# Patient Record
Sex: FEMALE | Race: WHITE | Employment: STUDENT | URBAN - METROPOLITAN AREA
[De-identification: names, ages, dates, MRNs, and addresses within clinical notes are randomized per-mention and may not be internally consistent; named-entity substitution may affect disease eponyms.]

---
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
## 2021-01-11 PROBLEM — F32.2 MDD (MAJOR DEPRESSIVE DISORDER), SINGLE EPISODE, SEVERE (HCC): Status: ACTIVE | Noted: 2021-01-11

## 2021-01-11 NOTE — PROGRESS NOTES
FROM TELEPHONE ENCOUNTER TODAY;   Pt has severe depression now.   Pt has a SI plan and went out in the woods to kill herself yesterday.   Pt called SI hotline and was stopped.  Pt plan tonight was to not eat, take pills, and go back to the woods where no o

## 2021-01-11 NOTE — ED NOTES
Pt asking for paper and utensils to draw, pt is art major. Paper and crayons provided with tray now.

## 2021-01-11 NOTE — ED NOTES
Spoke with Luquillo Cordial in [facility] now, states that pt will not be evaluated until after 7pm tonight.

## 2021-01-11 NOTE — ED NOTES
Pt walked to bathroom now, updated on when [facility] planning to come. Pt states understanding. Still calm, cooperative. Agreeable to care. Menu provided now.

## 2021-01-11 NOTE — ED PROVIDER NOTES
Patient Seen in: [facility] Emergency Department      History   Patient presents with:  Eval-P    Stated Complaint:     HPI/Subjective:   HPI    49-year-old with a history of asthma and depression presents for evaluation of suicidal ideation with a conversational dyspnea. Abdomen: Soft, nontender, nondistended. Back: No CVA tenderness. Extremities: No edema. Neuro: No facial droop.   Speech is normal.  Patient is alert and answers questions appropriately  Psychiatric: No apparent hallucinations only for medical purposes. POCT PREGNANCY, URINE   CBC W/ DIFFERENTIAL          Patient remains calm and cooperative while in the ER         MDM        Patient awaiting [facility] evaluation.   At this point I do consider her medically clear for inpatien

## 2021-01-11 NOTE — ED NOTES
Spoke with Charge RN now regarding delay in [facility] seeing pt. Charge RN to see if [facility] can see pt any sooner.

## 2021-01-11 NOTE — ED NOTES
Itzel Souza to see pt, MONICA to call this writer with time frame on when [facility] will come to see pt today

## 2021-01-11 NOTE — ED INITIAL ASSESSMENT (HPI)
Pt in from psychiatrics office for SI. Was planning to not eat, take pills to kill self, Has not acted on plan. Here on own act, calm and cooperative.

## 2021-01-12 PROBLEM — T78.40XA ALLERGY: Status: ACTIVE | Noted: 2021-01-12

## 2021-01-12 NOTE — PROGRESS NOTES
01/11/21 1856   COVID Exposure Risk Screening   Have you been practicing social distancing? Yes   Have you been wearing a mask when in the community? Yes   Are the people you live with following social distancing and wearing a mask?  Yes  (Live with her

## 2021-01-12 NOTE — ED PROVIDER NOTES
Patient here for evaluation of suicidal ideation, she is medically cleared at this time is awaiting placement.   Is cooperative and resting comfortably during my shift

## 2021-01-12 NOTE — ED NOTES
Dr Sesar Acosta accepts pt to rm [id number]. RN to RN at [id number]. Include original Petition and Certicate at transfer.

## 2021-01-12 NOTE — PROGRESS NOTES
I talked to the patient did the social screening by phone. I discussed it with Pritesh Ortiz, patient needs Infection Safety Block, because she works in [employer], last time she works was on 1/8/21.  Patient still need a petition and certific

## 2021-04-09 DIAGNOSIS — Z23 NEED FOR VACCINATION: ICD-10-CM

## 2021-10-18 ENCOUNTER — WALK IN (OUTPATIENT)
Dept: URGENT CARE | Age: 21
End: 2021-10-18

## 2021-10-18 ENCOUNTER — IMAGING SERVICES (OUTPATIENT)
Dept: GENERAL RADIOLOGY | Age: 21
End: 2021-10-18

## 2021-10-18 VITALS
OXYGEN SATURATION: 96 % | DIASTOLIC BLOOD PRESSURE: 87 MMHG | RESPIRATION RATE: 18 BRPM | TEMPERATURE: 98.5 F | SYSTOLIC BLOOD PRESSURE: 135 MMHG | HEART RATE: 80 BPM

## 2021-10-18 DIAGNOSIS — R53.83 FATIGUE, UNSPECIFIED TYPE: ICD-10-CM

## 2021-10-18 DIAGNOSIS — R06.02 SHORTNESS OF BREATH: ICD-10-CM

## 2021-10-18 DIAGNOSIS — R05.9 COUGH: ICD-10-CM

## 2021-10-18 DIAGNOSIS — R05.9 COUGH: Primary | ICD-10-CM

## 2021-10-18 PROBLEM — J45.20 MILD INTERMITTENT ASTHMA: Status: ACTIVE | Noted: 2021-10-18

## 2021-10-18 PROBLEM — F32.A DEPRESSION: Status: ACTIVE | Noted: 2021-10-18

## 2021-10-18 LAB
SARS-COV-2 RNA RESP QL NAA+PROBE: NOT DETECTED
SERVICE CMNT-IMP: NORMAL
SERVICE CMNT-IMP: NORMAL

## 2021-10-18 PROCEDURE — 99203 OFFICE O/P NEW LOW 30 MIN: CPT | Performed by: FAMILY MEDICINE

## 2021-10-18 PROCEDURE — U0003 INFECTIOUS AGENT DETECTION BY NUCLEIC ACID (DNA OR RNA); SEVERE ACUTE RESPIRATORY SYNDROME CORONAVIRUS 2 (SARS-COV-2) (CORONAVIRUS DISEASE [COVID-19]), AMPLIFIED PROBE TECHNIQUE, MAKING USE OF HIGH THROUGHPUT TECHNOLOGIES AS DESCRIBED BY CMS-2020-01-R: HCPCS | Performed by: INTERNAL MEDICINE

## 2021-10-18 PROCEDURE — U0005 INFEC AGEN DETEC AMPLI PROBE: HCPCS | Performed by: INTERNAL MEDICINE

## 2021-10-18 PROCEDURE — 71046 X-RAY EXAM CHEST 2 VIEWS: CPT | Performed by: RADIOLOGY

## 2021-10-18 RX ORDER — FLUTICASONE PROPIONATE 50 MCG
2 SPRAY, SUSPENSION (ML) NASAL
COMMUNITY
Start: 2021-07-30

## 2021-10-18 RX ORDER — HYDROXYZINE 50 MG/1
50 TABLET, FILM COATED ORAL EVERY 8 HOURS PRN
COMMUNITY

## 2021-10-18 RX ORDER — PREDNISONE 20 MG/1
20 TABLET ORAL DAILY
Qty: 5 TABLET | Refills: 0 | Status: SHIPPED | OUTPATIENT
Start: 2021-10-18

## 2021-10-18 RX ORDER — ALBUTEROL SULFATE 90 UG/1
2 AEROSOL, METERED RESPIRATORY (INHALATION) EVERY 4 HOURS PRN
COMMUNITY
Start: 2021-10-13

## 2021-10-18 RX ORDER — AZITHROMYCIN 500 MG/1
500 TABLET, FILM COATED ORAL DAILY
Qty: 5 TABLET | Refills: 0 | Status: SHIPPED | OUTPATIENT
Start: 2021-10-18 | End: 2021-10-23

## 2021-10-18 RX ORDER — IBUPROFEN 200 MG
200 TABLET ORAL
COMMUNITY

## 2021-10-18 RX ORDER — NORGESTIMATE AND ETHINYL ESTRADIOL 0.25-0.035
1 KIT ORAL
COMMUNITY
Start: 2021-06-16

## 2021-10-18 RX ORDER — DIPHENHYDRAMINE HCL 25 MG
25 CAPSULE ORAL EVERY 6 HOURS PRN
COMMUNITY

## 2021-10-18 RX ORDER — FLUTICASONE PROPIONATE 220 UG/1
2 AEROSOL, METERED RESPIRATORY (INHALATION)
COMMUNITY
Start: 2021-06-16

## 2021-10-18 RX ORDER — FEXOFENADINE HCL 180 MG/1
180 TABLET ORAL DAILY
COMMUNITY

## 2021-10-18 RX ORDER — SERTRALINE HYDROCHLORIDE 100 MG/1
100 TABLET, FILM COATED ORAL DAILY
COMMUNITY
Start: 2021-09-21

## 2021-10-19 ENCOUNTER — TELEPHONE (OUTPATIENT)
Dept: URGENT CARE | Age: 21
End: 2021-10-19

## 2021-11-03 PROBLEM — J45.40 MODERATE PERSISTENT ASTHMA WITHOUT COMPLICATION: Status: ACTIVE | Noted: 2021-10-18

## 2021-12-01 ENCOUNTER — WALK IN (OUTPATIENT)
Dept: URGENT CARE | Age: 21
End: 2021-12-01

## 2021-12-01 VITALS
HEART RATE: 88 BPM | TEMPERATURE: 98.1 F | SYSTOLIC BLOOD PRESSURE: 126 MMHG | OXYGEN SATURATION: 98 % | RESPIRATION RATE: 18 BRPM | DIASTOLIC BLOOD PRESSURE: 76 MMHG

## 2021-12-01 DIAGNOSIS — R68.83 CHILLS: ICD-10-CM

## 2021-12-01 DIAGNOSIS — R11.0 NAUSEA: Primary | ICD-10-CM

## 2021-12-01 DIAGNOSIS — R51.9 FRONTAL HEADACHE: ICD-10-CM

## 2021-12-01 DIAGNOSIS — R50.9 SUBJECTIVE FEVER: ICD-10-CM

## 2021-12-01 LAB
B-HCG UR QL: NEGATIVE
FLUAV AG UPPER RESP QL IA.RAPID: NEGATIVE
FLUBV AG UPPER RESP QL IA.RAPID: NEGATIVE
INTERNAL PROCEDURAL CONTROLS ACCEPTABLE: YES

## 2021-12-01 PROCEDURE — U0003 INFECTIOUS AGENT DETECTION BY NUCLEIC ACID (DNA OR RNA); SEVERE ACUTE RESPIRATORY SYNDROME CORONAVIRUS 2 (SARS-COV-2) (CORONAVIRUS DISEASE [COVID-19]), AMPLIFIED PROBE TECHNIQUE, MAKING USE OF HIGH THROUGHPUT TECHNOLOGIES AS DESCRIBED BY CMS-2020-01-R: HCPCS | Performed by: INTERNAL MEDICINE

## 2021-12-01 PROCEDURE — 99213 OFFICE O/P EST LOW 20 MIN: CPT | Performed by: PHYSICIAN ASSISTANT

## 2021-12-01 PROCEDURE — 81025 URINE PREGNANCY TEST: CPT | Performed by: PHYSICIAN ASSISTANT

## 2021-12-01 PROCEDURE — 87804 INFLUENZA ASSAY W/OPTIC: CPT | Performed by: PHYSICIAN ASSISTANT

## 2021-12-01 PROCEDURE — 96372 THER/PROPH/DIAG INJ SC/IM: CPT | Performed by: PHYSICIAN ASSISTANT

## 2021-12-01 PROCEDURE — U0005 INFEC AGEN DETEC AMPLI PROBE: HCPCS | Performed by: INTERNAL MEDICINE

## 2021-12-01 RX ORDER — ONDANSETRON 4 MG/1
4 TABLET, ORALLY DISINTEGRATING ORAL EVERY 8 HOURS PRN
Qty: 12 TABLET | Refills: 0 | Status: SHIPPED | OUTPATIENT
Start: 2021-12-01

## 2021-12-01 RX ORDER — FLUTICASONE PROPIONATE 220 UG/1
2 AEROSOL, METERED RESPIRATORY (INHALATION)
COMMUNITY
Start: 2021-11-24

## 2021-12-01 RX ORDER — ONDANSETRON 4 MG/1
4 TABLET, ORALLY DISINTEGRATING ORAL ONCE
Status: COMPLETED | OUTPATIENT
Start: 2021-12-01 | End: 2021-12-01

## 2021-12-01 RX ORDER — KETOROLAC TROMETHAMINE 30 MG/ML
30 INJECTION, SOLUTION INTRAMUSCULAR; INTRAVENOUS ONCE
Status: COMPLETED | OUTPATIENT
Start: 2021-12-01 | End: 2021-12-01

## 2021-12-01 RX ADMIN — KETOROLAC TROMETHAMINE 30 MG: 30 INJECTION, SOLUTION INTRAMUSCULAR; INTRAVENOUS at 12:26

## 2021-12-01 RX ADMIN — ONDANSETRON 4 MG: 4 TABLET, ORALLY DISINTEGRATING ORAL at 12:27

## 2021-12-01 ASSESSMENT — ENCOUNTER SYMPTOMS
ABDOMINAL PAIN: 0
VOMITING: 0
HEADACHES: 1
DIARRHEA: 0
COUGH: 0
CHILLS: 1
FEVER: 1
SHORTNESS OF BREATH: 0
CONSTIPATION: 0
NAUSEA: 1

## 2021-12-02 ENCOUNTER — TELEPHONE (OUTPATIENT)
Dept: URGENT CARE | Age: 21
End: 2021-12-02

## 2021-12-02 LAB
SARS-COV-2 RNA RESP QL NAA+PROBE: NOT DETECTED
SERVICE CMNT-IMP: NORMAL
SERVICE CMNT-IMP: NORMAL

## 2022-10-31 ENCOUNTER — OFFICE VISIT (OUTPATIENT)
Dept: FAMILY MEDICINE CLINIC | Facility: CLINIC | Age: 22
End: 2022-10-31
Payer: COMMERCIAL

## 2022-10-31 VITALS
OXYGEN SATURATION: 98 % | TEMPERATURE: 98 F | WEIGHT: 232 LBS | HEIGHT: 65 IN | BODY MASS INDEX: 38.65 KG/M2 | SYSTOLIC BLOOD PRESSURE: 126 MMHG | DIASTOLIC BLOOD PRESSURE: 80 MMHG | RESPIRATION RATE: 16 BRPM | HEART RATE: 92 BPM

## 2022-10-31 DIAGNOSIS — E66.09 CLASS 2 OBESITY DUE TO EXCESS CALORIES WITHOUT SERIOUS COMORBIDITY WITH BODY MASS INDEX (BMI) OF 38.0 TO 38.9 IN ADULT: ICD-10-CM

## 2022-10-31 DIAGNOSIS — Z11.3 SCREENING FOR STD (SEXUALLY TRANSMITTED DISEASE): ICD-10-CM

## 2022-10-31 DIAGNOSIS — J45.30 MILD PERSISTENT ASTHMA WITHOUT COMPLICATION: ICD-10-CM

## 2022-10-31 DIAGNOSIS — F41.9 ANXIETY: ICD-10-CM

## 2022-10-31 DIAGNOSIS — Z00.00 HEALTHY ADULT ON ROUTINE PHYSICAL EXAMINATION: Primary | ICD-10-CM

## 2022-10-31 DIAGNOSIS — Z23 NEED FOR VACCINATION: ICD-10-CM

## 2022-10-31 PROBLEM — J45.40 MODERATE PERSISTENT ASTHMA WITHOUT COMPLICATION (HCC): Status: ACTIVE | Noted: 2021-10-18

## 2022-10-31 PROBLEM — F32.1 MODERATE MAJOR DEPRESSION (HCC): Status: ACTIVE | Noted: 2020-03-11

## 2022-10-31 PROBLEM — J45.40 MODERATE PERSISTENT ASTHMA WITHOUT COMPLICATION: Status: ACTIVE | Noted: 2021-10-18

## 2022-10-31 LAB
ALBUMIN SERPL-MCNC: 4 G/DL (ref 3.4–5)
ALBUMIN/GLOB SERPL: 1.3 {RATIO} (ref 1–2)
ALP LIVER SERPL-CCNC: 96 U/L
ALT SERPL-CCNC: 38 U/L
ANION GAP SERPL CALC-SCNC: 4 MMOL/L (ref 0–18)
AST SERPL-CCNC: 16 U/L (ref 15–37)
BASOPHILS # BLD AUTO: 0.04 X10(3) UL (ref 0–0.2)
BASOPHILS NFR BLD AUTO: 0.5 %
BILIRUB SERPL-MCNC: 0.3 MG/DL (ref 0.1–2)
BUN BLD-MCNC: 18 MG/DL (ref 7–18)
CALCIUM BLD-MCNC: 9.2 MG/DL (ref 8.5–10.1)
CHLORIDE SERPL-SCNC: 106 MMOL/L (ref 98–112)
CHOLEST SERPL-MCNC: 180 MG/DL (ref ?–200)
CO2 SERPL-SCNC: 25 MMOL/L (ref 21–32)
CREAT BLD-MCNC: 0.92 MG/DL
EOSINOPHIL # BLD AUTO: 0.46 X10(3) UL (ref 0–0.7)
EOSINOPHIL NFR BLD AUTO: 5.5 %
ERYTHROCYTE [DISTWIDTH] IN BLOOD BY AUTOMATED COUNT: 12.3 %
FASTING PATIENT LIPID ANSWER: NO
FASTING STATUS PATIENT QL REPORTED: NO
GFR SERPLBLD BASED ON 1.73 SQ M-ARVRAT: 90 ML/MIN/1.73M2 (ref 60–?)
GLOBULIN PLAS-MCNC: 3.1 G/DL (ref 2.8–4.4)
GLUCOSE BLD-MCNC: 110 MG/DL (ref 70–99)
HCT VFR BLD AUTO: 43.6 %
HDLC SERPL-MCNC: 61 MG/DL (ref 40–59)
HGB BLD-MCNC: 14.4 G/DL
IMM GRANULOCYTES # BLD AUTO: 0.02 X10(3) UL (ref 0–1)
IMM GRANULOCYTES NFR BLD: 0.2 %
LDLC SERPL CALC-MCNC: 100 MG/DL (ref ?–100)
LYMPHOCYTES # BLD AUTO: 2.56 X10(3) UL (ref 1–4)
LYMPHOCYTES NFR BLD AUTO: 30.8 %
MCH RBC QN AUTO: 28.3 PG (ref 26–34)
MCHC RBC AUTO-ENTMCNC: 33 G/DL (ref 31–37)
MCV RBC AUTO: 85.8 FL
MONOCYTES # BLD AUTO: 0.33 X10(3) UL (ref 0.1–1)
MONOCYTES NFR BLD AUTO: 4 %
NEUTROPHILS # BLD AUTO: 4.91 X10 (3) UL (ref 1.5–7.7)
NEUTROPHILS # BLD AUTO: 4.91 X10(3) UL (ref 1.5–7.7)
NEUTROPHILS NFR BLD AUTO: 59 %
NONHDLC SERPL-MCNC: 119 MG/DL (ref ?–130)
OSMOLALITY SERPL CALC.SUM OF ELEC: 283 MOSM/KG (ref 275–295)
PLATELET # BLD AUTO: 257 10(3)UL (ref 150–450)
POTASSIUM SERPL-SCNC: 4.2 MMOL/L (ref 3.5–5.1)
PROT SERPL-MCNC: 7.1 G/DL (ref 6.4–8.2)
RBC # BLD AUTO: 5.08 X10(6)UL
SODIUM SERPL-SCNC: 135 MMOL/L (ref 136–145)
TRIGL SERPL-MCNC: 105 MG/DL (ref 30–149)
TSI SER-ACNC: 1.59 MIU/ML (ref 0.36–3.74)
VLDLC SERPL CALC-MCNC: 17 MG/DL (ref 0–30)
WBC # BLD AUTO: 8.3 X10(3) UL (ref 4–11)

## 2022-10-31 PROCEDURE — 90471 IMMUNIZATION ADMIN: CPT | Performed by: FAMILY MEDICINE

## 2022-10-31 PROCEDURE — 90472 IMMUNIZATION ADMIN EACH ADD: CPT | Performed by: FAMILY MEDICINE

## 2022-10-31 PROCEDURE — 90686 IIV4 VACC NO PRSV 0.5 ML IM: CPT | Performed by: FAMILY MEDICINE

## 2022-10-31 PROCEDURE — 83036 HEMOGLOBIN GLYCOSYLATED A1C: CPT | Performed by: FAMILY MEDICINE

## 2022-10-31 PROCEDURE — 80050 GENERAL HEALTH PANEL: CPT | Performed by: FAMILY MEDICINE

## 2022-10-31 PROCEDURE — 3074F SYST BP LT 130 MM HG: CPT | Performed by: FAMILY MEDICINE

## 2022-10-31 PROCEDURE — 87491 CHLMYD TRACH DNA AMP PROBE: CPT | Performed by: FAMILY MEDICINE

## 2022-10-31 PROCEDURE — 3079F DIAST BP 80-89 MM HG: CPT | Performed by: FAMILY MEDICINE

## 2022-10-31 PROCEDURE — 90715 TDAP VACCINE 7 YRS/> IM: CPT | Performed by: FAMILY MEDICINE

## 2022-10-31 PROCEDURE — 80061 LIPID PANEL: CPT | Performed by: FAMILY MEDICINE

## 2022-10-31 PROCEDURE — 3008F BODY MASS INDEX DOCD: CPT | Performed by: FAMILY MEDICINE

## 2022-10-31 PROCEDURE — 87591 N.GONORRHOEAE DNA AMP PROB: CPT | Performed by: FAMILY MEDICINE

## 2022-10-31 PROCEDURE — 99395 PREV VISIT EST AGE 18-39: CPT | Performed by: FAMILY MEDICINE

## 2022-10-31 RX ORDER — FLUTICASONE PROPIONATE 220 UG/1
2 AEROSOL, METERED RESPIRATORY (INHALATION) 2 TIMES DAILY
Qty: 12 G | Refills: 0 | Status: SHIPPED | OUTPATIENT
Start: 2022-10-31

## 2022-10-31 RX ORDER — ALBUTEROL SULFATE 90 UG/1
2 AEROSOL, METERED RESPIRATORY (INHALATION) EVERY 4 HOURS PRN
Qty: 18 G | Refills: 0 | Status: SHIPPED | OUTPATIENT
Start: 2022-10-31

## 2022-11-01 LAB
C TRACH DNA SPEC QL NAA+PROBE: NEGATIVE
EST. AVERAGE GLUCOSE BLD GHB EST-MCNC: 128 MG/DL (ref 68–126)
HBA1C MFR BLD: 6.1 % (ref ?–5.7)
N GONORRHOEA DNA SPEC QL NAA+PROBE: NEGATIVE

## 2022-11-14 ENCOUNTER — PATIENT MESSAGE (OUTPATIENT)
Dept: FAMILY MEDICINE CLINIC | Facility: CLINIC | Age: 22
End: 2022-11-14

## 2022-11-14 NOTE — TELEPHONE ENCOUNTER
From: Thao Piedra  To: Jaqui Kimball DO  Sent: 11/14/2022 8:54 AM CST  Subject: Paige Tipton! I had a situation yesterday night at work where I fainted, I'll give you details below. I was seen by paramedics and they said all my vitals were fine( blood sugar and blood pressure were okay)but to get checked out eventually anyways, because it's not normal for me to pass out like that. . some people said it couldve been stress, lack of food during my shift, and also I DO get squeamish with certain situations regarding the body. My mother said it looked like a popped a blood vessel in my finger, but the bruise is gone now. So I was at work, standing at the [employer] stand and I felt pinching in the joint of my finger, and it started to get bruised. I was trying to figure out why it was doing that but then I started to get dizzy, my vision blurring and my hearing went too. I remember telling my coworker that I was feeling dizzy but i guess i passed out right after and fell backwards. My coworker tried to catch me but she told me that I did hit my head (I dont have any pain in my head and the paramedics checked me out too) she also said I was shaking like I was passed out for 3 minutes. But when I woke up I was pretty alert so the paramedics said it couldnt have been a seizure. I just wanted to ask what you thought.  Sorry for the long message, thank you! :)

## 2022-11-18 ENCOUNTER — OFFICE VISIT (OUTPATIENT)
Dept: FAMILY MEDICINE CLINIC | Facility: CLINIC | Age: 22
End: 2022-11-18
Payer: COMMERCIAL

## 2022-11-18 VITALS
DIASTOLIC BLOOD PRESSURE: 72 MMHG | TEMPERATURE: 98 F | BODY MASS INDEX: 39 KG/M2 | WEIGHT: 236.19 LBS | OXYGEN SATURATION: 96 % | RESPIRATION RATE: 16 BRPM | HEART RATE: 91 BPM | SYSTOLIC BLOOD PRESSURE: 124 MMHG

## 2022-11-18 DIAGNOSIS — R55 VASOVAGAL SYNCOPE: Primary | ICD-10-CM

## 2022-11-19 LAB
ATRIAL RATE: 87 BPM
P AXIS: 35 DEGREES
P-R INTERVAL: 132 MS
Q-T INTERVAL: 342 MS
QRS DURATION: 86 MS
QTC CALCULATION (BEZET): 411 MS
R AXIS: 40 DEGREES
T AXIS: 46 DEGREES
VENTRICULAR RATE: 87 BPM

## 2022-12-07 DIAGNOSIS — J45.30 MILD PERSISTENT ASTHMA WITHOUT COMPLICATION: ICD-10-CM

## 2022-12-07 RX ORDER — FLUTICASONE PROPIONATE 220 UG/1
AEROSOL, METERED RESPIRATORY (INHALATION)
Qty: 12 G | Refills: 0 | Status: SHIPPED | OUTPATIENT
Start: 2022-12-07

## 2022-12-07 NOTE — TELEPHONE ENCOUNTER
Asthma & COPD Medication Protocol Failed 12/07/2022 04:19 PM    Asthma Action Score greater than or equal to 20    AAP/ACT given in last 12 months    Appointment in past 6 or next 3 months       Routing to provider per protocol. FLOVENT  MCG/ACT Inhalation Aerosol  Last refilled on 10/31/22 for #12g  with 0 rf. Last labs 10/31/22. Last seen on 11/18/22. No future appointments. Thank you.

## 2023-01-05 DIAGNOSIS — J45.30 MILD PERSISTENT ASTHMA WITHOUT COMPLICATION: ICD-10-CM

## 2023-01-06 RX ORDER — ALBUTEROL SULFATE 90 UG/1
AEROSOL, METERED RESPIRATORY (INHALATION)
Qty: 18 G | Refills: 0 | Status: SHIPPED | OUTPATIENT
Start: 2023-01-06

## 2023-02-20 DIAGNOSIS — J45.30 MILD PERSISTENT ASTHMA WITHOUT COMPLICATION: ICD-10-CM

## 2023-02-21 RX ORDER — ALBUTEROL SULFATE 90 UG/1
AEROSOL, METERED RESPIRATORY (INHALATION)
Qty: 18 G | Refills: 0 | Status: SHIPPED | OUTPATIENT
Start: 2023-02-21

## 2023-07-17 DIAGNOSIS — J45.30 MILD PERSISTENT ASTHMA WITHOUT COMPLICATION: ICD-10-CM

## 2023-07-18 RX ORDER — ALBUTEROL SULFATE 90 UG/1
2 AEROSOL, METERED RESPIRATORY (INHALATION) EVERY 4 HOURS PRN
Qty: 18 G | Refills: 0 | Status: SHIPPED | OUTPATIENT
Start: 2023-07-18

## 2023-07-18 NOTE — TELEPHONE ENCOUNTER
Asthma & COPD Medication Protocol Hgadoj2907/17/2023 10:39 PM    Asthma Action Score greater than or equal to 20    Appointment in past 6 or next 3 months    AAP/ACT given in last 12 months     Last OV 11/18/22  Last refilled 2/21/23  18 g  0 refills

## 2023-08-08 ENCOUNTER — OFFICE VISIT (OUTPATIENT)
Dept: FAMILY MEDICINE CLINIC | Facility: CLINIC | Age: 23
End: 2023-08-08
Payer: COMMERCIAL

## 2023-08-08 VITALS
RESPIRATION RATE: 16 BRPM | HEART RATE: 76 BPM | BODY MASS INDEX: 39 KG/M2 | WEIGHT: 235 LBS | TEMPERATURE: 98 F | OXYGEN SATURATION: 97 % | SYSTOLIC BLOOD PRESSURE: 126 MMHG | DIASTOLIC BLOOD PRESSURE: 70 MMHG

## 2023-08-08 DIAGNOSIS — H92.01 RIGHT EAR PAIN: Primary | ICD-10-CM

## 2023-08-08 PROCEDURE — 3074F SYST BP LT 130 MM HG: CPT | Performed by: NURSE PRACTITIONER

## 2023-08-08 PROCEDURE — 3078F DIAST BP <80 MM HG: CPT | Performed by: NURSE PRACTITIONER

## 2023-08-08 PROCEDURE — 99213 OFFICE O/P EST LOW 20 MIN: CPT | Performed by: NURSE PRACTITIONER

## 2023-09-30 DIAGNOSIS — J45.30 MILD PERSISTENT ASTHMA WITHOUT COMPLICATION: ICD-10-CM

## 2023-09-30 RX ORDER — ALBUTEROL SULFATE 90 UG/1
2 AEROSOL, METERED RESPIRATORY (INHALATION) EVERY 4 HOURS PRN
Qty: 18 G | Refills: 0 | Status: SHIPPED | OUTPATIENT
Start: 2023-09-30

## 2023-09-30 NOTE — TELEPHONE ENCOUNTER
Pt failed refill protocol for the following reasons:  Asthma & COPD Medication Protocol Vrvvrn6609/30/2023 09:57 AM    Asthma Action Score greater than or equal to 20    AAP/ACT given in last 12 months    Appointment in past 6 or next 3 months         Last refill: 7/18/23  Last appt: 11/18/23  Next appt: No future appointments. Forward to Dr. Srikanth Escudero, please advise on refills. Thank you.

## 2023-10-11 ENCOUNTER — OFFICE VISIT (OUTPATIENT)
Dept: FAMILY MEDICINE CLINIC | Facility: CLINIC | Age: 23
End: 2023-10-11
Payer: COMMERCIAL

## 2023-10-11 VITALS
BODY MASS INDEX: 39.27 KG/M2 | HEIGHT: 64 IN | DIASTOLIC BLOOD PRESSURE: 78 MMHG | TEMPERATURE: 97 F | RESPIRATION RATE: 18 BRPM | WEIGHT: 230 LBS | HEART RATE: 51 BPM | OXYGEN SATURATION: 98 % | SYSTOLIC BLOOD PRESSURE: 118 MMHG

## 2023-10-11 DIAGNOSIS — S39.012A LUMBOSACRAL STRAIN, INITIAL ENCOUNTER: Primary | ICD-10-CM

## 2023-10-11 PROCEDURE — 3078F DIAST BP <80 MM HG: CPT | Performed by: PHYSICIAN ASSISTANT

## 2023-10-11 PROCEDURE — 3008F BODY MASS INDEX DOCD: CPT | Performed by: PHYSICIAN ASSISTANT

## 2023-10-11 PROCEDURE — 99213 OFFICE O/P EST LOW 20 MIN: CPT | Performed by: PHYSICIAN ASSISTANT

## 2023-10-11 PROCEDURE — 3074F SYST BP LT 130 MM HG: CPT | Performed by: PHYSICIAN ASSISTANT

## 2023-10-11 RX ORDER — CYCLOBENZAPRINE HCL 10 MG
10 TABLET ORAL 3 TIMES DAILY PRN
Qty: 30 TABLET | Refills: 0 | Status: SHIPPED | OUTPATIENT
Start: 2023-10-11

## 2023-11-12 DIAGNOSIS — J45.30 MILD PERSISTENT ASTHMA WITHOUT COMPLICATION: ICD-10-CM

## 2023-11-13 RX ORDER — ALBUTEROL SULFATE 90 UG/1
2 AEROSOL, METERED RESPIRATORY (INHALATION) EVERY 4 HOURS PRN
Qty: 18 G | Refills: 0 | Status: SHIPPED | OUTPATIENT
Start: 2023-11-13

## 2023-11-16 ENCOUNTER — HOSPITAL ENCOUNTER (EMERGENCY)
Age: 23
Discharge: HOME | End: 2023-11-16
Attending: EMERGENCY MEDICINE
Payer: COMMERCIAL

## 2023-11-16 VITALS
RESPIRATION RATE: 18 BRPM | BODY MASS INDEX: 39.27 KG/M2 | WEIGHT: 230 LBS | HEART RATE: 79 BPM | TEMPERATURE: 98 F | HEIGHT: 64 IN | DIASTOLIC BLOOD PRESSURE: 75 MMHG | OXYGEN SATURATION: 99 % | SYSTOLIC BLOOD PRESSURE: 115 MMHG

## 2023-11-16 DIAGNOSIS — S61.215A LACERATION OF LEFT RING FINGER WITHOUT FOREIGN BODY WITHOUT DAMAGE TO NAIL, INITIAL ENCOUNTER: Primary | ICD-10-CM

## 2023-11-16 PROCEDURE — 99283 EMERGENCY DEPT VISIT LOW MDM: CPT

## 2023-11-16 PROCEDURE — 99282 EMERGENCY DEPT VISIT SF MDM: CPT

## 2023-12-21 DIAGNOSIS — J45.30 MILD PERSISTENT ASTHMA WITHOUT COMPLICATION: ICD-10-CM

## 2023-12-21 RX ORDER — ALBUTEROL SULFATE 90 UG/1
2 AEROSOL, METERED RESPIRATORY (INHALATION) EVERY 4 HOURS PRN
Qty: 18 G | Refills: 0 | Status: SHIPPED | OUTPATIENT
Start: 2023-12-21

## 2024-01-20 DIAGNOSIS — J45.30 MILD PERSISTENT ASTHMA WITHOUT COMPLICATION: ICD-10-CM

## 2024-01-22 RX ORDER — ALBUTEROL SULFATE 90 UG/1
2 AEROSOL, METERED RESPIRATORY (INHALATION) EVERY 4 HOURS PRN
Qty: 18 G | Refills: 0 | Status: SHIPPED | OUTPATIENT
Start: 2024-01-22

## 2024-01-23 ENCOUNTER — OFFICE VISIT (OUTPATIENT)
Dept: FAMILY MEDICINE CLINIC | Facility: CLINIC | Age: 24
End: 2024-01-23
Payer: COMMERCIAL

## 2024-01-23 VITALS
RESPIRATION RATE: 20 BRPM | BODY MASS INDEX: 39 KG/M2 | OXYGEN SATURATION: 98 % | TEMPERATURE: 99 F | SYSTOLIC BLOOD PRESSURE: 120 MMHG | WEIGHT: 229.25 LBS | DIASTOLIC BLOOD PRESSURE: 82 MMHG | HEART RATE: 87 BPM

## 2024-01-23 DIAGNOSIS — R09.81 NASAL CONGESTION: ICD-10-CM

## 2024-01-23 DIAGNOSIS — M79.642 PAIN IN BOTH HANDS: ICD-10-CM

## 2024-01-23 DIAGNOSIS — R53.82 CHRONIC FATIGUE: ICD-10-CM

## 2024-01-23 DIAGNOSIS — J30.9 ALLERGIC RHINITIS, UNSPECIFIED SEASONALITY, UNSPECIFIED TRIGGER: ICD-10-CM

## 2024-01-23 DIAGNOSIS — Z23 NEED FOR VACCINATION: ICD-10-CM

## 2024-01-23 DIAGNOSIS — M79.641 PAIN IN BOTH HANDS: ICD-10-CM

## 2024-01-23 DIAGNOSIS — R06.83 SNORING: Primary | ICD-10-CM

## 2024-01-23 PROCEDURE — 99214 OFFICE O/P EST MOD 30 MIN: CPT | Performed by: FAMILY MEDICINE

## 2024-01-23 PROCEDURE — 3074F SYST BP LT 130 MM HG: CPT | Performed by: FAMILY MEDICINE

## 2024-01-23 PROCEDURE — 90471 IMMUNIZATION ADMIN: CPT | Performed by: FAMILY MEDICINE

## 2024-01-23 PROCEDURE — 90686 IIV4 VACC NO PRSV 0.5 ML IM: CPT | Performed by: FAMILY MEDICINE

## 2024-01-23 PROCEDURE — 3079F DIAST BP 80-89 MM HG: CPT | Performed by: FAMILY MEDICINE

## 2024-01-23 RX ORDER — SERTRALINE HYDROCHLORIDE 100 MG/1
100 TABLET, FILM COATED ORAL DAILY
COMMUNITY
Start: 2023-12-13

## 2024-01-23 RX ORDER — MONTELUKAST SODIUM 10 MG/1
10 TABLET ORAL NIGHTLY
Qty: 30 TABLET | Refills: 0 | Status: SHIPPED | OUTPATIENT
Start: 2024-01-23

## 2024-01-23 NOTE — PROGRESS NOTES
Francisca Piedra is a 23 year old female.  Chief Complaint   Patient presents with    Medication Follow-Up    Sleep Apnea       HPI:   Sleep apnea: heavy snoring. Screening at dentist was positive. Mom has to wake her up, stops breathing. Waking up tired, tired while driving.     Has constant nasal congestion. Tried nasal spray, hasn't worked. Mouth breaths all the time. Takes allegra daily. Has tried flonase and a prescription in the past.     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 Not sleeping much.     Hands, wrists, knuckle joints will hurt. Elbows and wrists will hurt. Uses compression braces, doesn't always help. Ibuprofen helps some. Worse in cold weather. Hasn't noticed swelling. No stiffness in hands. Feet are sometimes stiff in the AM. Wearing supportive shoes.     Gets pain in arch of feet.     Due for physical. Sees gyne, but last pap was 2021.     ALLERGIES:  Allergies   Allergen Reactions    Other ITCHING    Seasonal ITCHING         Current Outpatient Medications   Medication Sig Dispense Refill    sertraline 100 MG Oral Tab Take 1 tablet (100 mg total) by mouth daily.      montelukast 10 MG Oral Tab Take 1 tablet (10 mg total) by mouth nightly. 30 tablet 0    albuterol 108 (90 Base) MCG/ACT Inhalation Aero Soln Inhale 2 puffs into the lungs every 4 (four) hours as needed for Wheezing. 18 g 0    FLOVENT  MCG/ACT Inhalation Aerosol INHALE 2 PUFFS INTO THE LUNGS EVERY NIGHT IN THE MORNING AND AT BEDTIME 12 g 0    fexofenadine 180 MG Oral Tab Take 1 tablet (180 mg total) by mouth daily.      hydrocortisone 1 % External Ointment Apply topically 2 (two) times daily.  0    cyclobenzaprine 10 MG Oral Tab Take 1 tablet (10 mg total) by mouth 3 (three) times daily as needed for Muscle spasms. (Patient not taking: Reported on 1/23/2024) 30 tablet 0    diphenhydrAMINE 25 MG Oral Cap Take 1 capsule (25 mg total) by mouth. (Patient not taking: Reported on 1/23/2024)      hydrOXYzine HCl 25 MG  Oral Tab Take 1 tablet (25 mg total) by mouth 3 (three) times daily as needed for Anxiety. (Patient not taking: Reported on 10/11/2023) 45 tablet 1    melatonin 3 MG Oral Tab For insomnia . Take 3 mm at bed time. (Patient not taking: Reported on 10/11/2023) 30 tablet 0      Past Medical History:   Diagnosis Date    Asthma     Depression       Social History:  Social History     Socioeconomic History    Marital status: Single   Tobacco Use    Smoking status: Never    Smokeless tobacco: Never   Vaping Use    Vaping Use: Never used   Substance and Sexual Activity    Alcohol use: Yes     Comment: social    Drug use: No        BP Readings from Last 6 Encounters:   01/23/24 120/82   11/16/23 115/75   10/11/23 118/78   08/08/23 126/70   11/18/22 124/72   10/31/22 126/80       Wt Readings from Last 6 Encounters:   01/23/24 229 lb 4 oz (104 kg)   11/16/23 230 lb (104.3 kg)   10/11/23 230 lb (104.3 kg)   08/08/23 235 lb (106.6 kg)   11/18/22 236 lb 3.2 oz (107.1 kg)   10/31/22 232 lb (105.2 kg)       REVIEW OF SYSTEMS:   GENERAL HEALTH: feels well no complaints other than above  SKIN: denies any unusual skin lesions or rashes  RESPIRATORY: denies shortness of breath    CARDIOVASCULAR: denies chest pain   GI: denies abdominal pain and denies heartburn  NEURO: denies headaches    EXAM:   /82 (BP Location: Left arm, Patient Position: Sitting, Cuff Size: large)   Pulse 87   Temp 98.6 °F (37 °C) (Temporal)   Resp 20   Wt 229 lb 4 oz (104 kg)   LMP 11/14/2023 (Exact Date)   SpO2 98%   BMI 39.35 kg/m²  Body mass index is 39.35 kg/m².      GENERAL: well developed, well nourished,in no apparent distress  SKIN: no rashes,no suspicious lesions  HEENT: atraumatic, normocephalic,ears and throat are clear, + nasal congestion   NECK: supple,no adenopathy  LUNGS: clear to auscultation  CARDIO: RRR without murmur  GI: good BS's,no masses, HSM or tenderness  EXTREMITIES: no cyanosis, clubbing or edema    ASSESSMENT AND PLAN:      Encounter Diagnoses   Name Primary?    Snoring Yes    Need for vaccination     Allergic rhinitis, unspecified seasonality, unspecified trigger     Nasal congestion     Chronic fatigue     Pain in both hands        Diagnoses and all orders for this visit:    Snoring  -     Home Sleep Apnea Test (Adult pt only) - Sleep consult required for Medicare pts  -     General sleep study; Future  - send for sleep study, very likely has ÁNGEL.     Need for vaccination  -     Fluzone Quadrivalent 6mo+ 0.5mL    Allergic rhinitis, unspecified seasonality, unspecified trigger  -     montelukast 10 MG Oral Tab; Take 1 tablet (10 mg total) by mouth nightly.  - trial of singulair for nasal congestion and chronic cough.   - follow up in 1 month for physical and will check labs and decide if further treatment is needed.     Nasal congestion  -     Home Sleep Apnea Test (Adult pt only) - Sleep consult required for Medicare pts  -     General sleep study; Future    Chronic fatigue  -     Home Sleep Apnea Test (Adult pt only) - Sleep consult required for Medicare pts  -     General sleep study; Future    Pain in hands  - consider RF with labs.     Orders Placed This Encounter   Procedures    Fluzone Quadrivalent 6mo+ 0.5mL               Meds & Refills for this Visit:  Requested Prescriptions     Signed Prescriptions Disp Refills    montelukast 10 MG Oral Tab 30 tablet 0     Sig: Take 1 tablet (10 mg total) by mouth nightly.             The patient indicates understanding of these issues and agrees to the plan.

## 2024-01-24 ENCOUNTER — PATIENT MESSAGE (OUTPATIENT)
Dept: FAMILY MEDICINE CLINIC | Facility: CLINIC | Age: 24
End: 2024-01-24

## 2024-01-24 NOTE — TELEPHONE ENCOUNTER
From: Francisca Piedra  To: Crissy Rose  Sent: 1/24/2024 12:18 PM CST  Subject: Home Sleep Lab    Ruby Rose, I saw that you ordered an at home sleep lab. I was wondering if this means I get a kit sent to my house or is there something else I need to do? Thank you!

## 2024-01-29 ENCOUNTER — PATIENT MESSAGE (OUTPATIENT)
Dept: FAMILY MEDICINE CLINIC | Facility: CLINIC | Age: 24
End: 2024-01-29

## 2024-01-29 DIAGNOSIS — R09.81 NASAL CONGESTION: Primary | ICD-10-CM

## 2024-01-29 NOTE — TELEPHONE ENCOUNTER
From: Francisca Piedra  To: Crissy Rose  Sent: 1/29/2024 2:40 AM CST  Subject: Hacking/Coughing Fits    [place] again Dr. Rose, I am messaging you because a little bit before our visit I started to develope coughing fits that trigger my asthma and are making me gag and hack out of nowhere. I feel a little bit of something in the middle of my chest when I breathe but it's not the same thing as an asthma attack (even though it triggers it). It's starting to affect my sleep and it just happened again when I was at work. I'm hoping to get this under control soon because it francis hurts too. Thank you!

## 2024-01-30 RX ORDER — AZITHROMYCIN 250 MG/1
TABLET, FILM COATED ORAL
Qty: 6 TABLET | Refills: 0 | Status: SHIPPED | OUTPATIENT
Start: 2024-01-30 | End: 2024-02-03

## 2024-02-03 DIAGNOSIS — J45.30 MILD PERSISTENT ASTHMA WITHOUT COMPLICATION: ICD-10-CM

## 2024-02-03 NOTE — TELEPHONE ENCOUNTER
Asthma & COPD Medication Protocol Gkuknm2702/03/2024 08:52 AM    Asthma Action Score greater than or equal to 20    AAP/ACT given in last 12 months    Appointment in past 6 or next 3 months     Last OV:01/23/2024  Last refill:01/22/2024, 18 g    Medication pended, please sign if appropriate

## 2024-02-05 RX ORDER — ALBUTEROL SULFATE 90 UG/1
2 AEROSOL, METERED RESPIRATORY (INHALATION) EVERY 4 HOURS PRN
Qty: 18 G | Refills: 0 | Status: SHIPPED | OUTPATIENT
Start: 2024-02-05

## 2024-02-09 ENCOUNTER — HOSPITAL ENCOUNTER (OUTPATIENT)
Dept: GENERAL RADIOLOGY | Age: 24
Discharge: HOME | End: 2024-02-09
Attending: FAMILY MEDICINE
Payer: COMMERCIAL

## 2024-02-09 ENCOUNTER — OFFICE VISIT (OUTPATIENT)
Dept: FAMILY MEDICINE CLINIC | Facility: CLINIC | Age: 24
End: 2024-02-09
Payer: COMMERCIAL

## 2024-02-09 ENCOUNTER — TELEPHONE (OUTPATIENT)
Dept: SLEEP CENTER | Age: 24
End: 2024-02-09

## 2024-02-09 VITALS
HEART RATE: 81 BPM | RESPIRATION RATE: 18 BRPM | SYSTOLIC BLOOD PRESSURE: 136 MMHG | OXYGEN SATURATION: 98 % | TEMPERATURE: 98 F | BODY MASS INDEX: 39 KG/M2 | WEIGHT: 229 LBS | DIASTOLIC BLOOD PRESSURE: 72 MMHG

## 2024-02-09 DIAGNOSIS — R05.2 SUBACUTE COUGH: Primary | ICD-10-CM

## 2024-02-09 DIAGNOSIS — J40 BRONCHITIS: ICD-10-CM

## 2024-02-09 DIAGNOSIS — R05.2 SUBACUTE COUGH: ICD-10-CM

## 2024-02-09 PROCEDURE — 71046 X-RAY EXAM CHEST 2 VIEWS: CPT | Performed by: FAMILY MEDICINE

## 2024-02-09 PROCEDURE — 3075F SYST BP GE 130 - 139MM HG: CPT | Performed by: FAMILY MEDICINE

## 2024-02-09 PROCEDURE — 99214 OFFICE O/P EST MOD 30 MIN: CPT | Performed by: FAMILY MEDICINE

## 2024-02-09 PROCEDURE — 3078F DIAST BP <80 MM HG: CPT | Performed by: FAMILY MEDICINE

## 2024-02-09 RX ORDER — PREDNISONE 20 MG/1
40 TABLET ORAL DAILY
Qty: 10 TABLET | Refills: 0 | Status: SHIPPED | OUTPATIENT
Start: 2024-02-09 | End: 2024-02-14

## 2024-02-09 NOTE — PROGRESS NOTES
Francisca Piedra is a 23 year old female.  Chief Complaint   Patient presents with    Cough    Asthma       HPI:   Has been sick for 3 weeks.   Last week I sent in a zpack, that maybe helped a little.  But now it's getting worse again. Waking up in the middle of the night coughing.   Taking albuterol 2 puffs multiple times daily, helps only for a short time.   Gets really bad out of nowhere. Worse with laughing. Can start with a tickle.   Her asthma is usually triggered by temp changes.     ALLERGIES:  Allergies   Allergen Reactions    Other ITCHING    Seasonal ITCHING         Current Outpatient Medications   Medication Sig Dispense Refill    predniSONE 20 MG Oral Tab Take 2 tablets (40 mg total) by mouth daily for 5 days. 10 tablet 0    albuterol 108 (90 Base) MCG/ACT Inhalation Aero Soln Inhale 2 puffs into the lungs every 4 (four) hours as needed for Wheezing. 18 g 0    sertraline 100 MG Oral Tab Take 1 tablet (100 mg total) by mouth daily.      montelukast 10 MG Oral Tab Take 1 tablet (10 mg total) by mouth nightly. 30 tablet 0    fexofenadine 180 MG Oral Tab Take 1 tablet (180 mg total) by mouth daily.      diphenhydrAMINE 25 MG Oral Cap Take 1 capsule (25 mg total) by mouth.      hydrocortisone 1 % External Ointment Apply topically 2 (two) times daily.  0    cyclobenzaprine 10 MG Oral Tab Take 1 tablet (10 mg total) by mouth 3 (three) times daily as needed for Muscle spasms. (Patient not taking: Reported on 1/23/2024) 30 tablet 0    FLOVENT  MCG/ACT Inhalation Aerosol INHALE 2 PUFFS INTO THE LUNGS EVERY NIGHT IN THE MORNING AND AT BEDTIME (Patient not taking: Reported on 2/9/2024) 12 g 0    hydrOXYzine HCl 25 MG Oral Tab Take 1 tablet (25 mg total) by mouth 3 (three) times daily as needed for Anxiety. (Patient not taking: Reported on 10/11/2023) 45 tablet 1    melatonin 3 MG Oral Tab For insomnia . Take 3 mm at bed time. (Patient not taking: Reported on 10/11/2023) 30 tablet 0      Past Medical  History:   Diagnosis Date    Asthma     Depression       Social History:  Social History     Socioeconomic History    Marital status: Single   Tobacco Use    Smoking status: Never    Smokeless tobacco: Never   Vaping Use    Vaping Use: Never used   Substance and Sexual Activity    Alcohol use: Yes     Comment: social    Drug use: No        BP Readings from Last 6 Encounters:   02/09/24 136/72   01/23/24 120/82   11/16/23 115/75   10/11/23 118/78   08/08/23 126/70   11/18/22 124/72       Wt Readings from Last 6 Encounters:   02/09/24 229 lb (103.9 kg)   01/23/24 229 lb 4 oz (104 kg)   11/16/23 230 lb (104.3 kg)   10/11/23 230 lb (104.3 kg)   08/08/23 235 lb (106.6 kg)   11/18/22 236 lb 3.2 oz (107.1 kg)       REVIEW OF SYSTEMS:   GENERAL HEALTH: feels well no complaints other than above   SKIN: denies any unusual skin lesions or rashes  RESPIRATORY: denies shortness of breath    CARDIOVASCULAR: denies chest pain   GI: denies abdominal pain and denies heartburn  NEURO: denies headaches    EXAM:   /72 (BP Location: Left arm, Patient Position: Sitting, Cuff Size: large)   Pulse 81   Temp 98.3 °F (36.8 °C) (Temporal)   Resp 18   Wt 229 lb (103.9 kg)   LMP 02/09/2024 (Exact Date)   SpO2 98%   BMI 39.31 kg/m²  Body mass index is 39.31 kg/m².      GENERAL: well developed, well nourished,in no apparent distress  SKIN: no rashes,no suspicious lesions  HEENT: atraumatic, normocephalic,ears and throat are clear  NECK: supple,no adenopathy,   LUNGS: decreased air movement, no wheezing or rales.   CARDIO: RRR without murmur  GI: good BS's,no masses, HSM or tenderness  EXTREMITIES: no cyanosis, clubbing or edema    ASSESSMENT AND PLAN:     Encounter Diagnoses   Name Primary?    Subacute cough Yes    Bronchitis        Diagnoses and all orders for this visit:    Subacute cough  -     XR CHEST PA + LAT CHEST (CPT=71046); Future    Bronchitis  -     predniSONE 20 MG Oral Tab; Take 2 tablets (40 mg total) by mouth daily  for 5 days.    Treat as above.   CXR shows no PNA, so will hold off abx for now.   Follow up in 2 weeks if not getting better.     No orders of the defined types were placed in this encounter.              Meds & Refills for this Visit:  Requested Prescriptions     Signed Prescriptions Disp Refills    predniSONE 20 MG Oral Tab 10 tablet 0     Sig: Take 2 tablets (40 mg total) by mouth daily for 5 days.             The patient indicates understanding of these issues and agrees to the plan.

## 2024-02-24 DIAGNOSIS — J30.9 ALLERGIC RHINITIS, UNSPECIFIED SEASONALITY, UNSPECIFIED TRIGGER: ICD-10-CM

## 2024-02-26 NOTE — TELEPHONE ENCOUNTER
Patient notified via detailed voicemail left at cell number (ok per  HIPAA consent)  Advised to call office back to let KE know if med is helping or not.  Office phone number provided

## 2024-03-06 DIAGNOSIS — J45.30 MILD PERSISTENT ASTHMA WITHOUT COMPLICATION (HCC): ICD-10-CM

## 2024-03-06 RX ORDER — ALBUTEROL SULFATE 90 UG/1
2 AEROSOL, METERED RESPIRATORY (INHALATION) EVERY 4 HOURS PRN
Qty: 18 G | Refills: 1 | Status: SHIPPED | OUTPATIENT
Start: 2024-03-06

## 2024-03-06 RX ORDER — ALBUTEROL SULFATE 90 UG/1
2 AEROSOL, METERED RESPIRATORY (INHALATION) EVERY 4 HOURS PRN
Qty: 18 G | Refills: 1 | Status: CANCELLED | OUTPATIENT
Start: 2024-03-06

## 2024-04-02 ENCOUNTER — PATIENT MESSAGE (OUTPATIENT)
Dept: FAMILY MEDICINE CLINIC | Facility: CLINIC | Age: 24
End: 2024-04-02

## 2024-04-02 NOTE — TELEPHONE ENCOUNTER
From: Kailey KAMARA  To: Francisca Piedra  Sent: 4/2/2024 4:19 PM CDT  Subject: Medication - Montelukast    Arnaud    We've been trying to reach you regarding your medication- Montelukast.  Dr. Rose would like to know if it's helping?    You can reply to this [employer] message or call our office at [phone].    Thanks!  Kailey RAZA

## 2024-04-03 RX ORDER — MONTELUKAST SODIUM 10 MG/1
10 TABLET ORAL NIGHTLY
Qty: 30 TABLET | Refills: 0 | OUTPATIENT
Start: 2024-04-03

## 2024-04-03 NOTE — TELEPHONE ENCOUNTER
Psychiatric Non-Scheduled (Anti-Anxiety) Zbvahp1804/03/2024 02:48 PM   Protocol Details Depression Screening completed within the past 12 months    In person appointment or virtual visit in the past 6 mos or appointment in next 3 mos      Routing to provider per protocol.   sertraline 100 MG Oral Tab   Last refilled on 12/13/23 for #  with  rf. -EXTERNAL.  Last labs 10/31/22.   Last seen on 2/9/24.     No future appointments.       Thank you.

## 2024-04-04 DIAGNOSIS — J45.30 MILD PERSISTENT ASTHMA WITHOUT COMPLICATION (HCC): ICD-10-CM

## 2024-04-04 RX ORDER — SERTRALINE HYDROCHLORIDE 100 MG/1
100 TABLET, FILM COATED ORAL DAILY
Qty: 90 TABLET | Refills: 0 | Status: SHIPPED | OUTPATIENT
Start: 2024-04-04

## 2024-04-04 RX ORDER — ALBUTEROL SULFATE 90 UG/1
2 AEROSOL, METERED RESPIRATORY (INHALATION) EVERY 4 HOURS PRN
Qty: 18 G | Refills: 1 | Status: SHIPPED | OUTPATIENT
Start: 2024-04-04

## 2024-04-04 NOTE — TELEPHONE ENCOUNTER
Asthma & COPD Medication Protocol Dztidb9804/04/2024 11:44 AM   Protocol Details Asthma Action Score greater than or equal to 20    AAP/ACT given in last 12 months    Appointment in past 6 or next 3 months          LOV 2/9/24     Last Refill   albuterol 108 (90 Base) MCG/ACT Inhalation Aero Soln 18 g 1 3/6/2024       No future appointments.

## 2024-04-19 DIAGNOSIS — J45.30 MILD PERSISTENT ASTHMA WITHOUT COMPLICATION (HCC): ICD-10-CM

## 2024-04-19 RX ORDER — ALBUTEROL SULFATE 90 UG/1
2 AEROSOL, METERED RESPIRATORY (INHALATION) EVERY 4 HOURS PRN
Qty: 18 G | Refills: 1 | OUTPATIENT
Start: 2024-04-19

## 2024-05-09 DIAGNOSIS — J45.30 MILD PERSISTENT ASTHMA WITHOUT COMPLICATION (HCC): ICD-10-CM

## 2024-05-09 RX ORDER — ALBUTEROL SULFATE 90 UG/1
2 AEROSOL, METERED RESPIRATORY (INHALATION) EVERY 4 HOURS PRN
Qty: 18 G | Refills: 1 | Status: SHIPPED | OUTPATIENT
Start: 2024-05-09

## 2024-05-09 NOTE — TELEPHONE ENCOUNTER
Pt failed refill protocol for the following reasons:   albuterol 108 (90 Base) MCG/ACT Inhalation Aero Soln         Sig: Inhale 2 puffs into the lungs every 4 (four) hours as needed for Wheezing.    Disp: 18 g    Refills: 1    Start: 5/9/2024    Class: Normal    Non-formulary For: Mild persistent asthma without complication (HCC)    Last ordered: 1 month ago (4/4/2024) by Crissy Rose, DO    Asthma & COPD Medication Protocol Hhnuri3105/09/2024 12:45 PM   Protocol Details Asthma Action Score greater than or equal to 20    AAP/ACT given in last 12 months    Appointment in past 6 or next 3 months      To be filled at: [id number] [address] [facility]  [place], [phone], [phone]         Last refill: 4/4/24  Last appt: 2/9/24  Next appt: No future appointments.'      Forward to Dr. Rose, please advise on refills. Thank you.

## 2024-08-06 RX ORDER — SERTRALINE HYDROCHLORIDE 100 MG/1
100 TABLET, FILM COATED ORAL DAILY
Qty: 90 TABLET | Refills: 0 | Status: SHIPPED | OUTPATIENT
Start: 2024-08-06

## 2024-08-06 NOTE — TELEPHONE ENCOUNTER
Psychiatric Non-Scheduled (Anti-Anxiety) Xxrzpf6408/06/2024 12:45 PM   Protocol Details Depression Screening completed within the past 12 months    In person appointment or virtual visit in the past 6 mos or appointment in next 3 mos          LOV 2/9/24     Last Refill  Medication Quantity Refills Start End   sertraline 100 MG Oral Tab 90 tablet 0 4/4/2024        No future appointments.

## 2024-09-10 ENCOUNTER — OFFICE VISIT (OUTPATIENT)
Dept: FAMILY MEDICINE CLINIC | Facility: CLINIC | Age: 24
End: 2024-09-10
Payer: COMMERCIAL

## 2024-09-10 VITALS
BODY MASS INDEX: 41.39 KG/M2 | HEART RATE: 104 BPM | DIASTOLIC BLOOD PRESSURE: 84 MMHG | OXYGEN SATURATION: 96 % | HEIGHT: 63 IN | WEIGHT: 233.63 LBS | TEMPERATURE: 100 F | SYSTOLIC BLOOD PRESSURE: 122 MMHG

## 2024-09-10 DIAGNOSIS — F32.2 MDD (MAJOR DEPRESSIVE DISORDER), SINGLE EPISODE, SEVERE (HCC): ICD-10-CM

## 2024-09-10 DIAGNOSIS — Z00.00 HEALTHY ADULT ON ROUTINE PHYSICAL EXAMINATION: Primary | ICD-10-CM

## 2024-09-10 DIAGNOSIS — Z23 NEED FOR VACCINATION: ICD-10-CM

## 2024-09-10 DIAGNOSIS — J45.30 MILD PERSISTENT ASTHMA WITHOUT COMPLICATION (HCC): ICD-10-CM

## 2024-09-10 DIAGNOSIS — E66.09 CLASS 2 OBESITY DUE TO EXCESS CALORIES WITHOUT SERIOUS COMORBIDITY WITH BODY MASS INDEX (BMI) OF 38.0 TO 38.9 IN ADULT: ICD-10-CM

## 2024-09-10 DIAGNOSIS — R09.81 SINUS CONGESTION: ICD-10-CM

## 2024-09-10 LAB
ALBUMIN SERPL-MCNC: 4.4 G/DL (ref 3.2–4.8)
ALBUMIN/GLOB SERPL: 1.6 {RATIO} (ref 1–2)
ALP LIVER SERPL-CCNC: 93 U/L
ALT SERPL-CCNC: 32 U/L
ANION GAP SERPL CALC-SCNC: 9 MMOL/L (ref 0–18)
AST SERPL-CCNC: 26 U/L (ref ?–34)
BILIRUB SERPL-MCNC: 0.3 MG/DL (ref 0.3–1.2)
BUN BLD-MCNC: 10 MG/DL (ref 9–23)
CALCIUM BLD-MCNC: 9.6 MG/DL (ref 8.7–10.4)
CHLORIDE SERPL-SCNC: 105 MMOL/L (ref 98–112)
CHOLEST SERPL-MCNC: 150 MG/DL (ref ?–200)
CO2 SERPL-SCNC: 23 MMOL/L (ref 21–32)
CREAT BLD-MCNC: 0.91 MG/DL
EGFRCR SERPLBLD CKD-EPI 2021: 90 ML/MIN/1.73M2 (ref 60–?)
EST. AVERAGE GLUCOSE BLD GHB EST-MCNC: 123 MG/DL (ref 68–126)
FASTING PATIENT LIPID ANSWER: NO
FASTING STATUS PATIENT QL REPORTED: NO
GLOBULIN PLAS-MCNC: 2.8 G/DL (ref 2–3.5)
GLUCOSE BLD-MCNC: 112 MG/DL (ref 70–99)
HBA1C MFR BLD: 5.9 % (ref ?–5.7)
HDLC SERPL-MCNC: 48 MG/DL (ref 40–59)
LDLC SERPL CALC-MCNC: 73 MG/DL (ref ?–100)
NONHDLC SERPL-MCNC: 102 MG/DL (ref ?–130)
OSMOLALITY SERPL CALC.SUM OF ELEC: 284 MOSM/KG (ref 275–295)
POTASSIUM SERPL-SCNC: 4 MMOL/L (ref 3.5–5.1)
PROT SERPL-MCNC: 7.2 G/DL (ref 5.7–8.2)
SODIUM SERPL-SCNC: 137 MMOL/L (ref 136–145)
TRIGL SERPL-MCNC: 168 MG/DL (ref 30–149)
TSI SER-ACNC: 1.38 MIU/ML (ref 0.55–4.78)
VLDLC SERPL CALC-MCNC: 26 MG/DL (ref 0–30)

## 2024-09-10 PROCEDURE — 80061 LIPID PANEL: CPT | Performed by: FAMILY MEDICINE

## 2024-09-10 PROCEDURE — 90677 PCV20 VACCINE IM: CPT | Performed by: FAMILY MEDICINE

## 2024-09-10 PROCEDURE — 3008F BODY MASS INDEX DOCD: CPT | Performed by: FAMILY MEDICINE

## 2024-09-10 PROCEDURE — 80050 GENERAL HEALTH PANEL: CPT | Performed by: FAMILY MEDICINE

## 2024-09-10 PROCEDURE — 90471 IMMUNIZATION ADMIN: CPT | Performed by: FAMILY MEDICINE

## 2024-09-10 PROCEDURE — 3079F DIAST BP 80-89 MM HG: CPT | Performed by: FAMILY MEDICINE

## 2024-09-10 PROCEDURE — 99395 PREV VISIT EST AGE 18-39: CPT | Performed by: FAMILY MEDICINE

## 2024-09-10 PROCEDURE — 3074F SYST BP LT 130 MM HG: CPT | Performed by: FAMILY MEDICINE

## 2024-09-10 PROCEDURE — 83036 HEMOGLOBIN GLYCOSYLATED A1C: CPT | Performed by: FAMILY MEDICINE

## 2024-09-10 NOTE — PROGRESS NOTES
HPI:   Francisca Piedra is a 24 year old female who presents for a complete physical exam. Symptoms: denies discharge, itching, burning or dysuria, periods are regular. Patient complains of PMS and intensity of periods has been bad.  Following with Dr. Lake for paps. Due now.     Had a lot going on in family life this year.     Has been congested for years. She hasn't been able to breath her nose for a long time. She is mouth breather. Pressure and blockages in nose for years.   Taking allegra. Has tried nasal sprays and washes and hasn't helped. Has not seen ENT.   Allegra has helped a little bit.   She had a rash with singulair.     Was not able to do the sleep study.     Coffee addiction, working on cutting back. Drinks coffee due to bad sleep. Down to 1 coffee per day.     Gender dysphoria, talking with counselor and looking into hormonal therapy. Family doesn't know yet.     Immunization History   Administered Date(s) Administered    Covid-19 Vaccine Moderna 100 mcg/0.5 ml 04/01/2021, 04/29/2021    Covid-19 Vaccine Moderna 50 Mcg/0.25 Ml 12/01/2021    DTAP 04/24/2000, 06/19/2000, 09/11/2000, 09/24/2001, 05/24/2005    FLULAVAL 6 months & older 0.5 ml Prefilled syringe (98086) 10/31/2022    FLUZONE 6 months and older PFS 0.5 ml (19374) 01/23/2024    HEP B 09/11/2000, 11/22/2000, 03/21/2001    Hib, Unspecified Formulation 04/24/2000, 06/19/2000, 09/11/2000, 05/23/2001    Hpv Virus Vaccine 9 Aaliyah Im 08/05/2013, 11/05/2013, 03/18/2014    IPV 04/24/2000, 06/19/2000, 09/24/2001, 05/24/2005    Influenza 04/24/2000, 06/19/2000, 09/11/2000, 05/23/2001, 11/24/2004, 11/09/2006, 11/05/2007, 11/05/2008, 09/22/2012, 11/05/2013, 10/09/2019    MMR 05/23/2001, 05/24/2005    Meningococcal-Menactra 04/21/2011, 08/02/2017    Pneumococcal (Prevnar 7) 05/23/2001, 09/24/2001    Pneumococcal Conjugate PCV20 09/10/2024    TDAP 04/21/2011, 10/31/2022    Tb Intradermal Test 05/24/2005    Varicella 03/21/2001, 04/21/2011     Wt  Readings from Last 6 Encounters:   09/10/24 233 lb 9.6 oz (106 kg)   02/09/24 229 lb (103.9 kg)   01/23/24 229 lb 4 oz (104 kg)   11/16/23 230 lb (104.3 kg)   10/11/23 230 lb (104.3 kg)   08/08/23 235 lb (106.6 kg)     Body mass index is 41.38 kg/m².     Lab Results   Component Value Date     (H) 09/10/2024     (H) 10/31/2022    GLU 99 01/11/2021     Lab Results   Component Value Date    CHOLEST 150 09/10/2024    CHOLEST 180 10/31/2022     Lab Results   Component Value Date    HDL 48 09/10/2024    HDL 61 (H) 10/31/2022     Lab Results   Component Value Date    LDL 73 09/10/2024     (H) 10/31/2022     Lab Results   Component Value Date    AST 26 09/10/2024    AST 16 10/31/2022    AST 16 01/11/2021     Lab Results   Component Value Date    ALT 32 09/10/2024    ALT 38 10/31/2022    ALT 31 01/11/2021       Current Outpatient Medications   Medication Sig Dispense Refill    sertraline 100 MG Oral Tab Take 1 tablet (100 mg total) by mouth daily. 90 tablet 0    albuterol 108 (90 Base) MCG/ACT Inhalation Aero Soln Inhale 2 puffs into the lungs every 4 (four) hours as needed for Wheezing. 18 g 1    FLOVENT  MCG/ACT Inhalation Aerosol INHALE 2 PUFFS INTO THE LUNGS EVERY NIGHT IN THE MORNING AND AT BEDTIME 12 g 0    fexofenadine 180 MG Oral Tab Take 1 tablet (180 mg total) by mouth daily.      diphenhydrAMINE 25 MG Oral Cap Take 1 capsule (25 mg total) by mouth.      hydrocortisone 1 % External Ointment Apply topically 2 (two) times daily.  0    cyclobenzaprine 10 MG Oral Tab Take 1 tablet (10 mg total) by mouth 3 (three) times daily as needed for Muscle spasms. (Patient not taking: Reported on 1/23/2024) 30 tablet 0    hydrOXYzine HCl 25 MG Oral Tab Take 1 tablet (25 mg total) by mouth 3 (three) times daily as needed for Anxiety. (Patient not taking: Reported on 10/11/2023) 45 tablet 1    melatonin 3 MG Oral Tab For insomnia . Take 3 mm at bed time. (Patient not taking: Reported on 10/11/2023) 30  tablet 0      Past Medical History:    Asthma (HCC)    Depression      History reviewed. No pertinent surgical history.   Family History   Problem Relation Age of Onset    Substance Abuse Father     Depression Father     Anxiety Father     Bipolar Disorder Father     Depression Mother     Alcohol and Other Disorders Associated Maternal Grandfather     Alcohol and Other Disorders Associated Paternal Grandmother       Social History:   Social History     Socioeconomic History    Marital status: Single   Tobacco Use    Smoking status: Never    Smokeless tobacco: Never   Vaping Use    Vaping status: Never Used   Substance and Sexual Activity    Alcohol use: Not Currently     Comment: social    Drug use: Yes     Types: Cannabis     Social Determinants of Health      [facility]    Social Connections    Received from [facility], [facility]    Housing Stability     Occ: [employer]. : no. Children: none.   Exercise: minimal.  Diet: watches minimally     REVIEW OF SYSTEMS:   GENERAL: feels well otherwise  SKIN: denies any unusual skin lesions  EYES:denies blurred vision or double vision  HEENT: denies nasal congestion, sinus pain or ST  LUNGS: denies shortness of breath with exertion  CARDIOVASCULAR: denies chest pain on exertion  GI: denies abdominal pain,denies heartburn  : denies dysuria, vaginal discharge or itching,periods regular   MUSCULOSKELETAL: denies back pain or joint pain   NEURO: denies headaches  PSYCHE: see HPI   HEMATOLOGIC: denies hx of anemia  ENDOCRINE: denies thyroid history  ALL/ASTHMA: see HPI     EXAM:   /84   Pulse 104   Temp 99.7 °F (37.6 °C) (Temporal)   Ht 5' 3\" (1.6 m)   Wt 233 lb 9.6 oz (106 kg)   LMP 09/09/2024 (Exact Date)   SpO2 96%   BMI 41.38 kg/m²   Body mass index is 41.38 kg/m².   GENERAL: well developed, well nourished,in no apparent distress  SKIN: no rashes,no  suspicious lesions  HEENT: atraumatic, normocephalic,ears and throat are clear  EYES:PERRLA, EOMI, conjunctiva are clear  NECK: supple,no adenopathy,   CHEST: no chest tenderness  BREAST: not done   LUNGS: clear to auscultation  CARDIO: RRR without murmur  GI: good BS's,no masses, HSM or tenderness  : not done   MUSCULOSKELETAL: back is not tender,FROM of the back  EXTREMITIES: no cyanosis, clubbing or edema  NEURO: Oriented times three,cranial nerves are intact,motor and sensory are grossly intact    ASSESSMENT AND PLAN:   Francisca Piedra is a 24 year old female who presents for a complete physical exam. Pap and pelvic to be done with gyne. Not due for mammogram and dexascan.  Health maintenance, will check fasting Lipids, CMP, and CBC. Pt not due for screening colonoscopy. Pt info handouts given for: exercise, low fat diet and breast self-exam. Pt' s weight is Body mass index is 41.38 kg/m²., recommended low fat diet and aerobic exercise 30 minutes three times weekly.  The patient indicates understanding of these issues and agrees to the plan.  The patient is asked to return for CPX in 1 yr or sooner if needed.     Encounter Diagnoses   Name Primary?    Healthy adult on routine physical examination Yes    MDD (major depressive disorder), single episode, severe (HCC)     Sinus congestion     Need for vaccination     Class 2 obesity due to excess calories without serious comorbidity with body mass index (BMI) of 38.0 to 38.9 in adult     Mild persistent asthma without complication (HCC)    - refer to ENT for chronic sinus issues.   - encouraged to work on healthy eating, diet changes.   - labs today.       Orders Placed This Encounter   Procedures    CBC With Differential With Platelet    Comp Metabolic Panel (14)    Hemoglobin A1C    Lipid Panel    TSH W Reflex To Free T4    Scan slide    PCV20 (Prevnar 20)    VENIPUNCTURE       Meds & Refills for this Visit:  Requested Prescriptions      No  prescriptions requested or ordered in this encounter       Imaging & Consults:  ENT - INTERNAL  PCV20 VACCINE FOR INTRAMUSCULAR USE

## 2024-09-11 LAB
BASOPHILS # BLD AUTO: 0.06 X10(3) UL (ref 0–0.2)
BASOPHILS NFR BLD AUTO: 0.7 %
EOSINOPHIL # BLD AUTO: 0.52 X10(3) UL (ref 0–0.7)
EOSINOPHIL NFR BLD AUTO: 6.1 %
ERYTHROCYTE [DISTWIDTH] IN BLOOD BY AUTOMATED COUNT: 12.7 %
HCT VFR BLD AUTO: 41.2 %
HGB BLD-MCNC: 14.2 G/DL
IMM GRANULOCYTES # BLD AUTO: 0.05 X10(3) UL (ref 0–1)
IMM GRANULOCYTES NFR BLD: 0.6 %
LYMPHOCYTES # BLD AUTO: 2.31 X10(3) UL (ref 1–4)
LYMPHOCYTES NFR BLD AUTO: 27.1 %
MCH RBC QN AUTO: 28.1 PG (ref 26–34)
MCHC RBC AUTO-ENTMCNC: 34.5 G/DL (ref 31–37)
MCV RBC AUTO: 81.6 FL
MONOCYTES # BLD AUTO: 0.34 X10(3) UL (ref 0.1–1)
MONOCYTES NFR BLD AUTO: 4 %
NEUTROPHILS # BLD AUTO: 5.23 X10 (3) UL (ref 1.5–7.7)
NEUTROPHILS # BLD AUTO: 5.23 X10(3) UL (ref 1.5–7.7)
NEUTROPHILS NFR BLD AUTO: 61.5 %
PLATELET # BLD AUTO: 238 10(3)UL (ref 150–450)
PLATELETS.RETICULATED NFR BLD AUTO: 4.6 % (ref 0–7)
RBC # BLD AUTO: 5.05 X10(6)UL
WBC # BLD AUTO: 8.5 X10(3) UL (ref 4–11)

## 2024-12-13 DIAGNOSIS — J45.30 MILD PERSISTENT ASTHMA WITHOUT COMPLICATION (HCC): ICD-10-CM

## 2024-12-14 RX ORDER — ALBUTEROL SULFATE 90 UG/1
2 INHALANT RESPIRATORY (INHALATION) EVERY 4 HOURS PRN
Qty: 18 G | Refills: 1 | Status: SHIPPED | OUTPATIENT
Start: 2024-12-14

## 2024-12-21 ENCOUNTER — PATIENT MESSAGE (OUTPATIENT)
Dept: FAMILY MEDICINE CLINIC | Facility: CLINIC | Age: 24
End: 2024-12-21

## 2025-01-13 DIAGNOSIS — J45.30 MILD PERSISTENT ASTHMA WITHOUT COMPLICATION (HCC): ICD-10-CM

## 2025-01-14 ENCOUNTER — OFFICE VISIT (OUTPATIENT)
Dept: FAMILY MEDICINE CLINIC | Facility: CLINIC | Age: 25
End: 2025-01-14
Payer: COMMERCIAL

## 2025-01-14 ENCOUNTER — HOSPITAL ENCOUNTER (OUTPATIENT)
Dept: GENERAL RADIOLOGY | Age: 25
Discharge: HOME | End: 2025-01-14
Attending: FAMILY MEDICINE
Payer: COMMERCIAL

## 2025-01-14 VITALS
TEMPERATURE: 98 F | WEIGHT: 230.81 LBS | DIASTOLIC BLOOD PRESSURE: 80 MMHG | HEART RATE: 87 BPM | RESPIRATION RATE: 18 BRPM | BODY MASS INDEX: 41 KG/M2 | OXYGEN SATURATION: 98 % | SYSTOLIC BLOOD PRESSURE: 136 MMHG

## 2025-01-14 DIAGNOSIS — M25.551 CHRONIC PAIN OF RIGHT HIP: Primary | ICD-10-CM

## 2025-01-14 DIAGNOSIS — M25.50 PAIN IN JOINTS: ICD-10-CM

## 2025-01-14 DIAGNOSIS — M25.60 JOINT STIFFNESS: ICD-10-CM

## 2025-01-14 DIAGNOSIS — G89.29 CHRONIC PAIN OF RIGHT HIP: ICD-10-CM

## 2025-01-14 DIAGNOSIS — G89.29 CHRONIC PAIN OF RIGHT HIP: Primary | ICD-10-CM

## 2025-01-14 DIAGNOSIS — M25.551 CHRONIC PAIN OF RIGHT HIP: ICD-10-CM

## 2025-01-14 LAB
CRP SERPL-MCNC: 0.6 MG/DL (ref ?–0.5)
ERYTHROCYTE [SEDIMENTATION RATE] IN BLOOD: 15 MM/HR
RHEUMATOID FACT SERPL-ACNC: 6 IU/ML (ref ?–14)

## 2025-01-14 PROCEDURE — 85652 RBC SED RATE AUTOMATED: CPT | Performed by: FAMILY MEDICINE

## 2025-01-14 PROCEDURE — 86431 RHEUMATOID FACTOR QUANT: CPT | Performed by: FAMILY MEDICINE

## 2025-01-14 PROCEDURE — 73502 X-RAY EXAM HIP UNI 2-3 VIEWS: CPT | Performed by: FAMILY MEDICINE

## 2025-01-14 PROCEDURE — 99214 OFFICE O/P EST MOD 30 MIN: CPT | Performed by: FAMILY MEDICINE

## 2025-01-14 PROCEDURE — 86140 C-REACTIVE PROTEIN: CPT | Performed by: FAMILY MEDICINE

## 2025-01-14 RX ORDER — ALBUTEROL SULFATE 90 UG/1
2 INHALANT RESPIRATORY (INHALATION) EVERY 4 HOURS PRN
Qty: 18 G | Refills: 1 | Status: SHIPPED | OUTPATIENT
Start: 2025-01-14

## 2025-01-14 NOTE — TELEPHONE ENCOUNTER
Asthma & COPD Medication Protocol [id number]/13/2025 09:52 PM   Protocol Details ACT Score greater than or equal to 20    ACT recorded in the last 12 months    Appointment in past 6 or next 3 months    Medication is active on med list     Last refill:   ALBUTEROL 108 (90 Base) MCG/ACT Inhalation Aero Soln 18 g 1 12/14/2024     Last Visit: 9/10/24    Next Visit:   Future Appointments   Date Time Provider Department Center   1/14/2025 10:30 AM Crissy Pena DO [facility] [place]         Forward to Dr. pena please advise on refills. Thanks.

## 2025-01-14 NOTE — PROGRESS NOTES
Francisca Piedra is a 24 year old adult.  Chief Complaint   Patient presents with    Pain     Shoulder and arm pain Hip and leg pain as well all right side        HPI:   Has issues with both shoulders. On and off both sides.   She had one bad episode where it felt hard to hold things. The pain was traveling down her left arm. That lasted 2 hrs. Better with ibuprofen, but she was in tears due to pain.   Happened to the other arm 2 days ago, but not as severe.   No pattern to what triggers it.   Does a lot of labor at work, but some days are better than others.   No injury that you can think of.   Also gets pains in wrists on both wrists.   She works at [employer], unloading pallets. Physcial work, lifting a lot.   She gets stiff in the morning at times.   Takes a few minutes to loosen up, but feels it when walking down the stairs.     Hip joint on right side hurts bad at times, from right groin around to back and travels down back side.   No numbness or tingling.     Takes ibuprofen a couple of times per week. Not daily, but starting to get more often.   Exercise helps some.     ALLERGIES:  Allergies[1]      Current Outpatient Medications   Medication Sig Dispense Refill    albuterol 108 (90 Base) MCG/ACT Inhalation Aero Soln Inhale 2 puffs into the lungs every 4 (four) hours as needed for Wheezing. 18 g 1    sertraline 100 MG Oral Tab Take 1 tablet (100 mg total) by mouth daily. 90 tablet 0    FLOVENT  MCG/ACT Inhalation Aerosol INHALE 2 PUFFS INTO THE LUNGS EVERY NIGHT IN THE MORNING AND AT BEDTIME 12 g 0    fexofenadine 180 MG Oral Tab Take 1 tablet (180 mg total) by mouth daily.      hydrocortisone 1 % External Ointment Apply topically 2 (two) times daily.  0    cyclobenzaprine 10 MG Oral Tab Take 1 tablet (10 mg total) by mouth 3 (three) times daily as needed for Muscle spasms. (Patient not taking: Reported on 1/14/2025) 30 tablet 0    diphenhydrAMINE 25 MG Oral Cap Take 1 capsule (25 mg total) by  mouth. (Patient not taking: Reported on 1/14/2025)      hydrOXYzine HCl 25 MG Oral Tab Take 1 tablet (25 mg total) by mouth 3 (three) times daily as needed for Anxiety. (Patient not taking: Reported on 1/14/2025) 45 tablet 1    melatonin 3 MG Oral Tab For insomnia . Take 3 mm at bed time. (Patient not taking: Reported on 1/14/2025) 30 tablet 0      Past Medical History:    Asthma (HCC)    Depression      Social History:  Social History     Socioeconomic History    Marital status: Single   Tobacco Use    Smoking status: Never    Smokeless tobacco: Never   Vaping Use    Vaping status: Never Used   Substance and Sexual Activity    Alcohol use: Not Currently     Comment: social    Drug use: Yes     Types: Cannabis     Social Drivers of Health     Food Insecurity: No Food Insecurity (1/14/2025)    NCSS - Food Insecurity     Worried About Running Out of Food in the Last Year: No     Ran Out of Food in the Last Year: No   Transportation Needs: No Transportation Needs (1/14/2025)    NCSS - Transportation     Lack of Transportation: No    [facility]    Social Connections   Housing Stability: Not At Risk (1/14/2025)    NCSS - Housing/Utilities     Has Housing: Yes     Worried About Losing Housing: No     Unable to Get Utilities: No        BP Readings from Last 6 Encounters:   01/14/25 136/80   09/10/24 122/84   02/09/24 136/72   01/23/24 120/82   11/16/23 115/75   10/11/23 118/78       Wt Readings from Last 6 Encounters:   01/14/25 230 lb 12.8 oz (104.7 kg)   09/10/24 233 lb 9.6 oz (106 kg)   02/09/24 229 lb (103.9 kg)   01/23/24 229 lb 4 oz (104 kg)   11/16/23 230 lb (104.3 kg)   10/11/23 230 lb (104.3 kg)       REVIEW OF SYSTEMS:   GENERAL HEALTH: feels well no complaints other than above   SKIN: denies any unusual skin lesions or rashes  RESPIRATORY: denies shortness of breath   CARDIOVASCULAR: denies chest pain   GI: denies abdominal pain and denies  heartburn  NEURO: denies headaches or dizziness     EXAM:   /80   Pulse 87   Temp 98.3 °F (36.8 °C) (Temporal)   Resp 18   Wt 230 lb 12.8 oz (104.7 kg)   LMP 12/31/2024 (Exact Date)   SpO2 98%   BMI 40.88 kg/m²  Body mass index is 40.88 kg/m².      GENERAL: well developed, well nourished,in no apparent distress  SKIN: no rashes,no suspicious lesions  HEENT: atraumatic, normocephalic   NECK: supple,no adenopathy,   LUNGS: clear to auscultation  CARDIO: RRR without murmur  GI: good BS's,no masses, HSM or tenderness  EXTREMITIES: no cyanosis, clubbing or edema  Musc; no spinal or bony tenderness, + muscle tension in upper back and shoulders b/l. Normal AROM  and PROM shoulders b/l, no sign of rotator cuff injury. B/l hips with normal ROM, although more flexible on left. No pain with flexion or rotation. Neg SLR b/l.     ASSESSMENT AND PLAN:     Encounter Diagnoses   Name Primary?    Chronic pain of right hip Yes    Joint stiffness     Pain in joints        Diagnoses and all orders for this visit:    Chronic pain of right hip  -     Physical Therapy Referral - [facility]  -     Rheumatoid Arthritis Factor [E]; Future  -     Sed Rate, Westergren (Automated) [E]; Future  -     C-Reactive Protein [E]; Future  -     Cancel: XR HIP W OR WO PELVIS 1 VIEW, RIGHT (CPT=73501); Future    Joint stiffness  -     Physical Therapy Referral - [facility]  -     Rheumatoid Arthritis Factor [E]; Future  -     Sed Rate, Westergren (Automated) [E]; Future  -     C-Reactive Protein [E]; Future  -     Cancel: XR HIP W OR WO PELVIS 1 VIEW, RIGHT (CPT=73501); Future    Pain in joints  -     Physical Therapy Referral - [facility]  -     Rheumatoid Arthritis Factor [E]; Future  -     Sed Rate, Westergren (Automated) [E]; Future  -     C-Reactive Protein [E]; Future  -     Cancel: XR HIP W OR WO PELVIS 1 VIEW, RIGHT (CPT=73501); Future    Check Xray hip since that has been chronic. Check labs given stiffness.   Advised  weight loss would likely help hip pain.   Shoulders likely due to lifting and strain and stress, likely shoulders.   PT ordered to help shoulders and hips.   Can take ibuprofen as needed for pain, but do not over-use.     Orders Placed This Encounter   Procedures    Rheumatoid Arthritis Factor [E]     Standing Status:   Future     Number of Occurrences:   1     Standing Expiration Date:   1/14/2026     Order Specific Question:   Release to patient     Answer:   Immediate    Sed Debra Londono (Automated) [E]     Standing Status:   Future     Number of Occurrences:   1     Standing Expiration Date:   1/14/2026     Order Specific Question:   Release to patient     Answer:   Immediate    C-Reactive Protein [E]     Standing Status:   Future     Number of Occurrences:   1     Standing Expiration Date:   1/14/2026     Order Specific Question:   Release to patient     Answer:   Immediate               Meds & Refills for this Visit:  Requested Prescriptions      No prescriptions requested or ordered in this encounter             The patient indicates understanding of these issues and agrees to the plan.               [1]   Allergies  Allergen Reactions    Other ITCHING    Montelukast RASH    Seasonal ITCHING

## 2025-03-18 RX ORDER — SERTRALINE HYDROCHLORIDE 100 MG/1
100 TABLET, FILM COATED ORAL DAILY
Qty: 90 TABLET | Refills: 0 | Status: SHIPPED | OUTPATIENT
Start: 2025-03-18

## 2025-03-18 NOTE — TELEPHONE ENCOUNTER
Psychiatric Non-Scheduled (Anti-Anxiety) Passed03/17/2025 09:04 PM   Protocol Details In person appointment or virtual visit in the past 6 mos or appointment in next 3 mos    Depression Screening completed within the past 12 months    Medication is active on med list

## 2025-04-11 DIAGNOSIS — J45.30 MILD PERSISTENT ASTHMA WITHOUT COMPLICATION (HCC): ICD-10-CM

## 2025-04-11 RX ORDER — ALBUTEROL SULFATE 90 UG/1
2 INHALANT RESPIRATORY (INHALATION) EVERY 4 HOURS PRN
Qty: 18 G | Refills: 1 | Status: SHIPPED | OUTPATIENT
Start: 2025-04-11

## 2025-04-11 NOTE — TELEPHONE ENCOUNTER
Asthma & COPD Medication Protocol [id number]/11/2025 06:49 AM   Protocol Details ACT Score greater than or equal to 20    ACT recorded in the last 12 months    Appointment in past 6 or next 3 months    Medication is active on med list       Last refill:   albuterol 108 (90 Base) MCG/ACT Inhalation Aero Soln 18 g 1 1/14/2025     Last Visit: 1/14/25    Next Visit:   Future Appointments   Date Time Provider Department Center   4/29/2025 10:40 AM Gurpreet Funez MD [id number]         Forward to Dr. Rose please advise on refills. Thanks.

## 2025-05-04 DIAGNOSIS — J45.30 MILD PERSISTENT ASTHMA WITHOUT COMPLICATION (HCC): ICD-10-CM

## 2025-05-05 RX ORDER — ALBUTEROL SULFATE 90 UG/1
2 INHALANT RESPIRATORY (INHALATION) EVERY 4 HOURS PRN
Qty: 18 G | Refills: 1 | Status: SHIPPED | OUTPATIENT
Start: 2025-05-05

## 2025-05-05 NOTE — TELEPHONE ENCOUNTER
Asthma & COPD Medication Protocol [id number]/04/2025 06:06 AM   Protocol Details ACT Score greater than or equal to 20    ACT recorded in the last 12 months    Appointment in past 6 or next 3 months    Medication is active on med list       Last refill:   Medication Quantity Refills Start End   albuterol 108 (90 Base) MCG/ACT Inhalation Aero Soln 18 g 1 4/11/2025      Last Visit: 1/14/25    Next Visit:   Future Appointments   Date Time Provider Department Center   6/3/2025  8:00 AM Gurpreet Funez MD [id number]   6/10/2025 11:45 AM Crissy Rose DO [facility] [place]         Forward to Dr. Rose  please advise on refills. Thanks.

## 2025-06-03 ENCOUNTER — OFFICE VISIT (OUTPATIENT)
Facility: LOCATION | Age: 25
End: 2025-06-03
Payer: COMMERCIAL

## 2025-06-03 DIAGNOSIS — G47.30 SLEEP DISORDER BREATHING: ICD-10-CM

## 2025-06-03 DIAGNOSIS — J33.9 NASAL POLYPOSIS: ICD-10-CM

## 2025-06-03 DIAGNOSIS — J30.1 NON-SEASONAL ALLERGIC RHINITIS DUE TO POLLEN: Primary | ICD-10-CM

## 2025-06-03 PROCEDURE — 31231 NASAL ENDOSCOPY DX: CPT | Performed by: OTOLARYNGOLOGY

## 2025-06-03 PROCEDURE — 99204 OFFICE O/P NEW MOD 45 MIN: CPT | Performed by: OTOLARYNGOLOGY

## 2025-06-03 RX ORDER — PREDNISONE 5 MG/1
5 TABLET ORAL DAILY
Qty: 14 TABLET | Refills: 0 | Status: SHIPPED | OUTPATIENT
Start: 2025-06-03

## 2025-06-03 RX ORDER — MOMETASONE FUROATE MONOHYDRATE 50 UG/1
2 SPRAY, METERED NASAL 2 TIMES DAILY
Qty: 17 G | Refills: 0 | Status: SHIPPED | OUTPATIENT
Start: 2025-06-03

## 2025-06-03 NOTE — PROGRESS NOTES
Francisca Piedra is a 25 year old adult.   Chief Complaint   Patient presents with    Sinus Problem     HPI:   Patient has a chronic history of nasal airway obstruction.  This has been going on for years.  At times gets headache and drainage.  Patient has tried different nasal sprays in the past but they are irritating.  Patient denies sinusitis.  Patient also has sleep disordered breathing with nasal congestion snoring and possible sleep apnea.  Current Medications[1]   Past Medical History[2]   Social History:  Short Social Hx on File[3]   Past Surgical History[4]      REVIEW OF SYSTEMS:   GENERAL HEALTH: feels well otherwise  GENERAL : denies fever, chills, sweats, weight loss, weight gain  SKIN: denies any unusual skin lesions or rashes  RESPIRATORY: denies shortness of breath with exertion  NEURO: denies headaches    EXAM:   LMP 12/31/2024 (Exact Date)     System Findings Details   Constitutional  Overall appearance - Normal.   Psychiatric  Orientation - Oriented to time, place, person & situation. Appropriate mood and affect.   Head/Face  Facial features -- Normal. Skull - Normal.   Eyes  Pupils equal ,round ,react to light and accomidate   Ears, Nose, Throat, Neck  Ears clear nose congestion oropharynx +2/4 tonsils with narrowing neck no masses   Neurological  Memory - Normal. Cranial nerves - Cranial nerves II through XII grossly intact.   Lymph Detail  Submental. Submandibular. Anterior cervical. Posterior cervical. Supraclavicular.   Procedure:  Due to inability for adequate examination of the nasal cavity and nasopharynx and need for magnification to perform the examination, endoscopy was offered.  The nasal endoscope was utilized as it was imperative to inspect the interior of the nasal cavity and the middle and superior meatus along with the turbinates and sphenoethmoid recess.  Risks and benefits were discussed with patient/family and they have given consent to proceed. A rigid zero degree  scope was inserted.    Findings:  The anterior of the nasal cavity along with the middle and superior meatus and turbinates and the sphenoethmoid recess were evaluated.  There is significant septal deviation to the left.  There is polypoid changes throughout the nose.  Nasopharynx is narrow otherwise clear    ASSESSMENT AND PLAN:   1. Non-seasonal allergic rhinitis due to pollen  Causing chronic congestion including nasal polyps    2. Nasal polyposis  I will treat her with prednisone and also Nasonex.  She will then follow-up with a dedicated CT of the sinus.  She also has a deviated septum.    3. Sleep disorder breathing  We will see how her breathing responds to treating the nasal airway obstruction.      The patient indicates understanding of these issues and agrees to the plan.    No follow-ups on file.    Gurpreet Funez MD  6/3/2025  8:29 AM       [1]   Current Outpatient Medications   Medication Sig Dispense Refill    predniSONE 5 MG Oral Tab Take 1 tablet (5 mg total) by mouth daily. 14 tablet 0    mometasone furoate 50 MCG/ACT Nasal Suspension 2 sprays by Nasal route 2 (two) times daily. 17 g 0    albuterol 108 (90 Base) MCG/ACT Inhalation Aero Soln Inhale 2 puffs into the lungs every 4 (four) hours as needed for Wheezing. 18 g 1    sertraline 50 MG Oral Tab Take 1 tablet (50 mg total) by mouth daily. 90 tablet 0    cyclobenzaprine 10 MG Oral Tab Take 1 tablet (10 mg total) by mouth 3 (three) times daily as needed for Muscle spasms. (Patient not taking: Reported on 1/14/2025) 30 tablet 0    FLOVENT  MCG/ACT Inhalation Aerosol INHALE 2 PUFFS INTO THE LUNGS EVERY NIGHT IN THE MORNING AND AT BEDTIME 12 g 0    fexofenadine 180 MG Oral Tab Take 1 tablet (180 mg total) by mouth daily.      diphenhydrAMINE 25 MG Oral Cap Take 1 capsule (25 mg total) by mouth. (Patient not taking: Reported on 1/14/2025)      hydrOXYzine HCl 25 MG Oral Tab Take 1 tablet (25 mg total) by mouth 3 (three) times daily as  needed for Anxiety. (Patient not taking: Reported on 1/14/2025) 45 tablet 1    melatonin 3 MG Oral Tab For insomnia . Take 3 mm at bed time. (Patient not taking: Reported on 1/14/2025) 30 tablet 0    hydrocortisone 1 % External Ointment Apply topically 2 (two) times daily.  0   [2]   Past Medical History:   Asthma (HCC)    Depression   [3]   Social History  Socioeconomic History    Marital status: Single   Tobacco Use    Smoking status: Never    Smokeless tobacco: Never   Vaping Use    Vaping status: Never Used   Substance and Sexual Activity    Alcohol use: Not Currently     Comment: social    Drug use: Yes     Types: Cannabis     Social Drivers of Health     Food Insecurity: No Food Insecurity (1/14/2025)    NCSS - Food Insecurity     Worried About Running Out of Food in the Last Year: No     Ran Out of Food in the Last Year: No   Transportation Needs: No Transportation Needs (1/14/2025)    NCSS - Transportation     Lack of Transportation: No   Housing Stability: Not At Risk (1/14/2025)    NCSS - Housing/Utilities     Has Housing: Yes     Worried About Losing Housing: No     Unable to Get Utilities: No   [4] History reviewed. No pertinent surgical history.

## 2025-06-17 ENCOUNTER — PATIENT MESSAGE (OUTPATIENT)
Dept: FAMILY MEDICINE CLINIC | Facility: CLINIC | Age: 25
End: 2025-06-17

## 2025-06-17 ENCOUNTER — PATIENT MESSAGE (OUTPATIENT)
Facility: LOCATION | Age: 25
End: 2025-06-17

## 2025-06-17 ENCOUNTER — TELEPHONE (OUTPATIENT)
Dept: FAMILY MEDICINE | Age: 25
End: 2025-06-17

## 2025-06-17 ENCOUNTER — APPOINTMENT (OUTPATIENT)
Dept: FAMILY MEDICINE | Age: 25
End: 2025-06-17

## 2025-06-17 ENCOUNTER — LAB SERVICES (OUTPATIENT)
Dept: LAB | Age: 25
End: 2025-06-17

## 2025-06-17 VITALS
DIASTOLIC BLOOD PRESSURE: 66 MMHG | WEIGHT: 225.97 LBS | OXYGEN SATURATION: 98 % | RESPIRATION RATE: 16 BRPM | HEART RATE: 93 BPM | BODY MASS INDEX: 37.65 KG/M2 | HEIGHT: 65 IN | SYSTOLIC BLOOD PRESSURE: 127 MMHG

## 2025-06-17 DIAGNOSIS — Z13.0 SCREENING FOR ENDOCRINE, METABOLIC AND IMMUNITY DISORDER: ICD-10-CM

## 2025-06-17 DIAGNOSIS — I45.10 INCOMPLETE RBBB: Primary | ICD-10-CM

## 2025-06-17 DIAGNOSIS — Z13.6 SCREENING, ISCHEMIC HEART DISEASE: ICD-10-CM

## 2025-06-17 DIAGNOSIS — E34.9 HORMONE IMBALANCE: Primary | ICD-10-CM

## 2025-06-17 DIAGNOSIS — Z13.228 SCREENING FOR ENDOCRINE, METABOLIC AND IMMUNITY DISORDER: ICD-10-CM

## 2025-06-17 DIAGNOSIS — G47.30 SLEEP DISORDER BREATHING: Primary | ICD-10-CM

## 2025-06-17 DIAGNOSIS — Z13.29 SCREENING FOR ENDOCRINE, METABOLIC AND IMMUNITY DISORDER: ICD-10-CM

## 2025-06-17 PROCEDURE — 82642 DIHYDROTESTOSTERONE: CPT | Performed by: CLINICAL MEDICAL LABORATORY

## 2025-06-17 PROCEDURE — 84270 ASSAY OF SEX HORMONE GLOBUL: CPT | Performed by: CLINICAL MEDICAL LABORATORY

## 2025-06-17 PROCEDURE — 82670 ASSAY OF TOTAL ESTRADIOL: CPT | Performed by: CLINICAL MEDICAL LABORATORY

## 2025-06-17 PROCEDURE — 84403 ASSAY OF TOTAL TESTOSTERONE: CPT | Performed by: CLINICAL MEDICAL LABORATORY

## 2025-06-17 RX ORDER — MOMETASONE FUROATE MONOHYDRATE 50 UG/1
SPRAY, METERED NASAL
COMMUNITY

## 2025-06-17 RX ORDER — PREDNISONE 5 MG/1
5 TABLET ORAL
COMMUNITY

## 2025-06-17 RX ORDER — NORETHINDRONE ACETATE AND ETHINYL ESTRADIOL 1MG-20(21)
1 KIT ORAL DAILY
COMMUNITY
Start: 2025-05-13

## 2025-06-17 RX ORDER — TESTOSTERONE GEL, 1% 10 MG/G
50 GEL TRANSDERMAL DAILY
Qty: 90 PACKET | Refills: 0 | Status: SHIPPED | OUTPATIENT
Start: 2025-06-17

## 2025-06-17 SDOH — ECONOMIC STABILITY: TRANSPORTATION INSECURITY
IN THE PAST 12 MONTHS, HAS LACK OF RELIABLE TRANSPORTATION KEPT YOU FROM MEDICAL APPOINTMENTS, MEETINGS, WORK OR FROM GETTING THINGS NEEDED FOR DAILY LIVING?: YES

## 2025-06-17 SDOH — ECONOMIC STABILITY: FOOD INSECURITY: WITHIN THE PAST 12 MONTHS, THE FOOD YOU BOUGHT JUST DIDN'T LAST AND YOU DIDN'T HAVE MONEY TO GET MORE.: PATIENT DECLINED

## 2025-06-17 SDOH — ECONOMIC STABILITY: HOUSING INSECURITY: WHAT IS YOUR LIVING SITUATION TODAY?: I HAVE A PLACE TO LIVE TODAY, BUT I AM WORRIED ABOUT LOSING IT IN THE FUTURE

## 2025-06-17 SDOH — ECONOMIC STABILITY: HOUSING INSECURITY: DO YOU HAVE PROBLEMS WITH ANY OF THE FOLLOWING?: PATIENT DECLINED

## 2025-06-17 ASSESSMENT — SOCIAL DETERMINANTS OF HEALTH (SDOH)
IN THE PAST 12 MONTHS, HAS THE ELECTRIC, GAS, OIL, OR WATER COMPANY THREATENED TO SHUT OFF SERVICE IN YOUR HOME?: PATIENT DECLINED

## 2025-06-17 ASSESSMENT — PATIENT HEALTH QUESTIONNAIRE - PHQ9
1. LITTLE INTEREST OR PLEASURE IN DOING THINGS: NOT AT ALL
2. FEELING DOWN, DEPRESSED OR HOPELESS: NOT AT ALL
CLINICAL INTERPRETATION OF PHQ2 SCORE: NO FURTHER SCREENING NEEDED
SUM OF ALL RESPONSES TO PHQ9 QUESTIONS 1 AND 2: 0
SUM OF ALL RESPONSES TO PHQ9 QUESTIONS 1 AND 2: 0

## 2025-06-18 LAB
ESTRADIOL SERPL-MCNC: 195 PG/ML
SHBG SERPL-SCNC: 41 NMOL/L (ref 30–135)
TESTOST FREE MFR SERPL: 1.2 %
TESTOST FREE SERPL-MCNC: 2 PG/ML
TESTOST SERPL-MCNC: 16.6 NG/DL
TESTOSTERONE.FREE+WB SERPL-MCNC: 5 NG/DL

## 2025-06-20 PROBLEM — E34.9 HORMONE IMBALANCE: Status: ACTIVE | Noted: 2025-06-20

## 2025-06-20 PROBLEM — I45.10 INCOMPLETE RBBB: Status: ACTIVE | Noted: 2025-06-20

## 2025-06-20 ASSESSMENT — ENCOUNTER SYMPTOMS
FEVER: 0
SHORTNESS OF BREATH: 0
GASTROINTESTINAL NEGATIVE: 1
HALLUCINATIONS: 0
EYE DISCHARGE: 0
WHEEZING: 0
CHILLS: 0
BLURRED VISION: 0
EYE REDNESS: 0
COUGH: 0
SEIZURES: 0
SPEECH CHANGE: 0

## 2025-06-21 LAB — ANDROSTANOLONE SERPL-MCNC: 57.8 PG/ML (ref 24–208)

## 2025-06-22 ENCOUNTER — RESULTS FOLLOW-UP (OUTPATIENT)
Dept: FAMILY MEDICINE | Age: 25
End: 2025-06-22

## 2025-06-23 RX ORDER — PREDNISONE 5 MG/1
5 TABLET ORAL DAILY
Qty: 14 TABLET | Refills: 0 | Status: SHIPPED | OUTPATIENT
Start: 2025-06-23

## 2025-06-23 NOTE — TELEPHONE ENCOUNTER
Requested Prescriptions     Pending Prescriptions Disp Refills    PREDNISONE 5 MG Oral Tab [Pharmacy Med Name: PREDNISONE 5MG TABLETS] 14 tablet 0     Sig: TAKE 1 TABLET(5 MG) BY MOUTH DAILY       FILLED- 6/3/25  LOV- 6/3/25    No future appointments.

## 2025-06-26 ENCOUNTER — E-ADVICE (OUTPATIENT)
Dept: FAMILY MEDICINE | Age: 25
End: 2025-06-26

## 2025-07-02 DIAGNOSIS — E34.9 HORMONE IMBALANCE: ICD-10-CM

## 2025-07-02 RX ORDER — TESTOSTERONE GEL, 1% 10 MG/G
50 GEL TRANSDERMAL DAILY
Qty: 90 PACKET | Refills: 0 | Status: CANCELLED | OUTPATIENT
Start: 2025-07-02

## 2025-07-03 ENCOUNTER — TELEPHONE (OUTPATIENT)
Dept: FAMILY MEDICINE | Age: 25
End: 2025-07-03

## 2025-07-03 PROBLEM — F64.0: Status: ACTIVE | Noted: 2025-07-03

## 2025-07-03 PROBLEM — E34.9 HORMONE IMBALANCE: Status: RESOLVED | Noted: 2025-06-20 | Resolved: 2025-07-03

## 2025-07-07 RX ORDER — PREDNISONE 5 MG/1
5 TABLET ORAL DAILY
Qty: 14 TABLET | Refills: 0 | OUTPATIENT
Start: 2025-07-07

## 2025-07-07 NOTE — TELEPHONE ENCOUNTER
Requested Prescriptions     Pending Prescriptions Disp Refills    PREDNISONE 5 MG Oral Tab [Pharmacy Med Name: PREDNISONE 5MG TABLETS] 14 tablet 0     Sig: TAKE 1 TABLET(5 MG) BY MOUTH DAILY       FILLED- 6/23/25  LOV- 6/3/25    Future Appointments   Date Time Provider Department Center          7/29/2025  1:40 PM Gurpreet Funez MD [id number]

## 2025-07-08 ENCOUNTER — HOSPITAL ENCOUNTER (OUTPATIENT)
Dept: CV DIAGNOSTICS | Facility: HOSPITAL | Age: 25
Discharge: HOME | End: 2025-07-08
Attending: FAMILY MEDICINE
Payer: COMMERCIAL

## 2025-07-08 DIAGNOSIS — I45.10 INCOMPLETE RBBB: ICD-10-CM

## 2025-07-08 PROCEDURE — 93306 TTE W/DOPPLER COMPLETE: CPT | Performed by: FAMILY MEDICINE

## 2025-07-10 ENCOUNTER — E-ADVICE (OUTPATIENT)
Dept: FAMILY MEDICINE | Age: 25
End: 2025-07-10

## 2025-07-12 DIAGNOSIS — F64.0: ICD-10-CM

## 2025-07-12 RX ORDER — TESTOSTERONE GEL, 1% 10 MG/G
50 GEL TRANSDERMAL DAILY
Qty: 90 PACKET | Refills: 0 | Status: CANCELLED | OUTPATIENT
Start: 2025-07-12

## 2025-07-16 DIAGNOSIS — J45.30 MILD PERSISTENT ASTHMA WITHOUT COMPLICATION (HCC): ICD-10-CM

## 2025-07-21 DIAGNOSIS — F32.2 MDD (MAJOR DEPRESSIVE DISORDER), SINGLE EPISODE, SEVERE (HCC): ICD-10-CM

## 2025-07-21 RX ORDER — ALBUTEROL SULFATE 90 UG/1
2 INHALANT RESPIRATORY (INHALATION) EVERY 4 HOURS PRN
Qty: 8.5 G | Refills: 5 | Status: SHIPPED | OUTPATIENT
Start: 2025-07-21

## 2025-07-21 NOTE — TELEPHONE ENCOUNTER
Psychiatric Non-Scheduled (Anti-Anxiety) [id number]/21/2025 02:48 PM   Protocol Details In person appointment or virtual visit in the past 6 mos or appointment in next 3 mos    Depression Screening completed within the past 12 months    Medication is active on med list          Last refill:   sertraline 50 MG Oral Tab 90 tablet 0 4/16/2025     Last Visit: 1/14/25    Next Visit:   Future Appointments   Date Time Provider Department Center   7/29/2025  1:40 PM Gurpreet Funez MD [id number]   10/30/2025  1:00 PM Catrina Thorne, PAChayoC [id number] [address]         Forward to Dr. Rose please advise on refills. Thanks.

## 2025-07-23 DIAGNOSIS — F32.2 MDD (MAJOR DEPRESSIVE DISORDER), SINGLE EPISODE, SEVERE (HCC): ICD-10-CM

## 2025-07-23 NOTE — TELEPHONE ENCOUNTER
Patient's name and  verified   Informed patient medication was just refilled on 2025 for 90 tabs, 0 refill.  Patient is going to call the pharmacy  Patient notified and verbalized an understanding

## 2025-07-24 DIAGNOSIS — F32.2 MDD (MAJOR DEPRESSIVE DISORDER), SINGLE EPISODE, SEVERE (HCC): ICD-10-CM

## 2025-08-20 ENCOUNTER — PATIENT MESSAGE (OUTPATIENT)
Dept: FAMILY MEDICINE CLINIC | Facility: CLINIC | Age: 25
End: 2025-08-20

## 2025-08-24 DIAGNOSIS — J45.30 MILD PERSISTENT ASTHMA WITHOUT COMPLICATION (HCC): ICD-10-CM

## 2025-08-26 DIAGNOSIS — J45.30 MILD PERSISTENT ASTHMA WITHOUT COMPLICATION (HCC): ICD-10-CM

## 2025-08-28 RX ORDER — ALBUTEROL SULFATE 90 UG/1
2 INHALANT RESPIRATORY (INHALATION) EVERY 4 HOURS PRN
Qty: 8.5 G | Refills: 5 | OUTPATIENT
Start: 2025-08-28

## 2025-08-29 DIAGNOSIS — J45.30 MILD PERSISTENT ASTHMA WITHOUT COMPLICATION (HCC): ICD-10-CM

## 2025-08-29 RX ORDER — ALBUTEROL SULFATE 90 UG/1
2 INHALANT RESPIRATORY (INHALATION) EVERY 4 HOURS PRN
Qty: 8.5 G | Refills: 5 | Status: SHIPPED | OUTPATIENT
Start: 2025-08-29